# Patient Record
Sex: FEMALE | Race: WHITE | Employment: UNEMPLOYED | URBAN - METROPOLITAN AREA
[De-identification: names, ages, dates, MRNs, and addresses within clinical notes are randomized per-mention and may not be internally consistent; named-entity substitution may affect disease eponyms.]

---

## 2017-01-04 ENCOUNTER — TELEPHONE (OUTPATIENT)
Dept: FAMILY MEDICINE CLINIC | Age: 52
End: 2017-01-04

## 2017-01-04 NOTE — TELEPHONE ENCOUNTER
Medication: Symbicort 160-4.5 mcg/acc, dose: 2 puffs, how often: BID , current number of medication days provided: 90, refill per application. Lot #: [id number], EXP 01/2018  . This medication was received and verified for the following 1. Correct Patient, 2. Correct Diagnosis, 3. Correct Drug, 4. Correct route, and no current allergy to medication. Please contact patient to come  their medications.      Maeve Oliver MSN, RN, FNP-C     [facility]

## 2017-02-27 ENCOUNTER — TELEPHONE (OUTPATIENT)
Dept: NEUROLOGY | Age: 52
End: 2017-02-27

## 2017-02-27 DIAGNOSIS — R56.9 SEIZURE (HCC): ICD-10-CM

## 2017-02-27 DIAGNOSIS — R56.9 SEIZURE (HCC): Primary | Chronic | ICD-10-CM

## 2017-02-27 RX ORDER — DIVALPROEX SODIUM 500 MG/1
TABLET, EXTENDED RELEASE ORAL
Qty: 60 TAB | Refills: 2 | Status: CANCELLED | OUTPATIENT
Start: 2017-02-27

## 2017-02-27 RX ORDER — FLUOXETINE HYDROCHLORIDE 40 MG/1
40 CAPSULE ORAL DAILY
Qty: 30 CAP | Refills: 3 | Status: SHIPPED | COMMUNITY
Start: 2017-02-27 | End: 2017-03-20

## 2017-02-27 NOTE — TELEPHONE ENCOUNTER
Jethro Duque from [facility] call with a question. States she does not have Depakote DR available within their prescription program. She does have XR in 500mg. Would like to know if this would be okay and would you want to do 2 tabs 2 times a day? Requesting answer by the end of the day.   Office # [phone]  Cell # [phone]

## 2017-02-28 RX ORDER — DIVALPROEX SODIUM 500 MG/1
1500 TABLET, EXTENDED RELEASE ORAL DAILY
Qty: 60 TAB | Refills: 2 | COMMUNITY
Start: 2017-02-28 | End: 2017-04-04 | Stop reason: SDUPTHER

## 2017-02-28 NOTE — TELEPHONE ENCOUNTER
Per Dr. Tesfaye Wynn nurse Cherrie Carreon its ok to change the patient [facility]  with once a day dosing. Will order via [facility]. The patient needs to come in to sign paperwork to order via program. Will forward to [facility] to follow up.     Rafaela Jacob, MSN, RN, FNP-C     [facility]

## 2017-02-28 NOTE — TELEPHONE ENCOUNTER
Fifi Quinn MD                    That would be OK            Called NP Maryruth Bernheim and informed her of Dr. Ritesh Dacosta"

## 2017-03-14 ENCOUNTER — HOSPITAL ENCOUNTER (EMERGENCY)
Age: 52
Discharge: LEFT BEFORE DISCHARGE | End: 2017-03-15
Attending: EMERGENCY MEDICINE
Payer: COMMERCIAL

## 2017-03-14 DIAGNOSIS — F12.10 MARIJUANA ABUSE: ICD-10-CM

## 2017-03-14 DIAGNOSIS — R45.851 SUICIDAL IDEATION: Primary | ICD-10-CM

## 2017-03-14 DIAGNOSIS — F11.10 HEROIN ABUSE (HCC): ICD-10-CM

## 2017-03-14 DIAGNOSIS — F14.10 COCAINE ABUSE (HCC): ICD-10-CM

## 2017-03-14 LAB
AMPHET UR QL SCN: NEGATIVE
ANION GAP BLD CALC-SCNC: 6 MMOL/L (ref 3–18)
BARBITURATES UR QL SCN: NEGATIVE
BASOPHILS # BLD AUTO: 0 K/UL (ref 0–0.1)
BASOPHILS # BLD: 0 % (ref 0–2)
BENZODIAZ UR QL: NEGATIVE
BUN SERPL-MCNC: 29 MG/DL (ref 7–18)
BUN/CREAT SERPL: 40 (ref 12–20)
CALCIUM SERPL-MCNC: 8.9 MG/DL (ref 8.5–10.1)
CANNABINOIDS UR QL SCN: POSITIVE
CHLORIDE SERPL-SCNC: 105 MMOL/L (ref 100–108)
CO2 SERPL-SCNC: 30 MMOL/L (ref 21–32)
COCAINE UR QL SCN: POSITIVE
CREAT SERPL-MCNC: 0.73 MG/DL (ref 0.6–1.3)
DIFFERENTIAL METHOD BLD: ABNORMAL
EOSINOPHIL # BLD: 0.2 K/UL (ref 0–0.4)
EOSINOPHIL NFR BLD: 1 % (ref 0–5)
ERYTHROCYTE [DISTWIDTH] IN BLOOD BY AUTOMATED COUNT: 15.3 % (ref 11.6–14.5)
ETHANOL SERPL-MCNC: <3 MG/DL (ref 0–3)
GLUCOSE SERPL-MCNC: 84 MG/DL (ref 74–99)
HCT VFR BLD AUTO: 39.6 % (ref 35–45)
HDSCOM,HDSCOM: ABNORMAL
HGB BLD-MCNC: 13.8 G/DL (ref 12–16)
LYMPHOCYTES # BLD AUTO: 30 % (ref 21–52)
LYMPHOCYTES # BLD: 3.8 K/UL (ref 0.9–3.6)
MCH RBC QN AUTO: 31.4 PG (ref 24–34)
MCHC RBC AUTO-ENTMCNC: 34.8 G/DL (ref 31–37)
MCV RBC AUTO: 90 FL (ref 74–97)
METHADONE UR QL: NEGATIVE
MONOCYTES # BLD: 0.6 K/UL (ref 0.05–1.2)
MONOCYTES NFR BLD AUTO: 5 % (ref 3–10)
NEUTS SEG # BLD: 7.9 K/UL (ref 1.8–8)
NEUTS SEG NFR BLD AUTO: 64 % (ref 40–73)
OPIATES UR QL: POSITIVE
PCP UR QL: NEGATIVE
PLATELET # BLD AUTO: 207 K/UL (ref 135–420)
PMV BLD AUTO: 11.8 FL (ref 9.2–11.8)
POTASSIUM SERPL-SCNC: 3.6 MMOL/L (ref 3.5–5.5)
RBC # BLD AUTO: 4.4 M/UL (ref 4.2–5.3)
SODIUM SERPL-SCNC: 141 MMOL/L (ref 136–145)
WBC # BLD AUTO: 12.6 K/UL (ref 4.6–13.2)

## 2017-03-14 PROCEDURE — 85025 COMPLETE CBC W/AUTO DIFF WBC: CPT | Performed by: EMERGENCY MEDICINE

## 2017-03-14 PROCEDURE — 80048 BASIC METABOLIC PNL TOTAL CA: CPT | Performed by: EMERGENCY MEDICINE

## 2017-03-14 PROCEDURE — 80307 DRUG TEST PRSMV CHEM ANLYZR: CPT | Performed by: EMERGENCY MEDICINE

## 2017-03-14 PROCEDURE — 99285 EMERGENCY DEPT VISIT HI MDM: CPT

## 2017-03-14 NOTE — ED PROVIDER NOTES
[facility] EMERGENCY DEPT      46 y.o. female with noted past medical history who presents to the emergency department for heroin detoxification and suicidal ideation. No other complaints. No current facility-administered medications for this encounter. Current Outpatient Prescriptions   Medication Sig    divalproex ER (DEPAKOTE ER) 500 mg ER tablet Take 3 Tabs by mouth daily.  FLUoxetine (PROZAC) 40 mg capsule Take 1 Cap by mouth daily. Indications: ANXIETY WITH DEPRESSION    omega-3 acid ethyl esters (LOVAZA) 1 gram capsule Take 2 Caps by mouth daily (with breakfast). Indications: HYPERTRIGLYCERIDEMIA    budesonide-formoterol (SYMBICORT) 160-4.5 mcg/actuation HFA inhaler Take 2 Puffs by inhalation two (2) times a day.  lacosamide (VIMPAT) 200 mg tab tablet Take 1 Tab by mouth two (2) times a day. Indications: PARTIAL EPILEPSY TREATMENT ADJUNCT    DULoxetine (CYMBALTA) 60 mg capsule Take 1 Cap by mouth daily.  albuterol (VENTOLIN HFA) 90 mcg/actuation inhaler Take 2 Puffs by inhalation every four (4) hours as needed for Wheezing or Shortness of Breath (Cough). Indications: CHRONIC OBSTRUCTIVE PULMONARY DISEASE       Past Medical History:   Diagnosis Date    Chronic obstructive pulmonary disease ([address] 2016    severe. PFT confirmed. 2016    COPD     Cord compression [facility])     Dr Brit Sánchez Current smoker 2016    Depression     Hepatitis C antibody test positive 2015    Negative viral load, Immune    History of cocaine abuse     History of heroin abuse     Hypercholesterolemia     Other diseases of lung, not elsewhere classified     pneumonia    Seizures ([address] 2007    complex partial, unk etiology;  [facility] [place].  Ora Counter, Neurology-    Spinal stenosis     Thyroid disease     many years per pt    Tobacco use        Past Surgical History:   Procedure Laterality Date    HX CERVICAL DISKECTOMY      HX  SECTION      HX GYN            HX TUBAL LIGATION   Family History   Problem Relation Age of Onset    Cancer Father 48     lung    Lung Disease Father     Cancer Maternal Grandmother      lung    Lung Disease Maternal Grandmother        Social History     Social History    Marital status: UNKNOWN     Spouse name: N/A    Number of children: 2    Years of education: 8     Occupational History    Not on file. Social History Main Topics    Smoking status: Current Every Day Smoker     Packs/day: 0.25     Years: 35.00     Types: Cigarettes    Smokeless tobacco: Never Used    Alcohol use No    Drug use: 7.00 per week     Special: Heroin, Marijuana      Comment:  states it has been awhile since she did Heroin.  Sexual activity: Yes     Partners: Male     Birth control/ protection: Condom     Other Topics Concern     Service No    Blood Transfusions No    Caffeine Concern No    Occupational Exposure No    Hobby Hazards No    Sleep Concern No    Stress Concern No    Weight Concern No    Special Diet No    Back Care No    Exercise No    Bike Helmet No    Seat Belt Yes    Self-Exams No     Social History Narrative       Allergies   Allergen Reactions    Ampicillin Anaphylaxis    Penicillin G Anaphylaxis    Adhesive Tape-Silicones Rash    Tegretol [Carbamazepine] Rash       Patient's primary care provider (as noted in EPIC):  Tawanna Body, NP    REVIEW OF SYSTEMS:    Constitutional:  Negative for diaphoresis. HENT:  Negative for congestion. Respiratory:  Negative for cough and shortness of breath. Cardiovascular:  Negative for chest pain and palpitations. Gastrointestinal:  Negative for diarrhea. Genitourinary:  Negative for flank pain. Musculoskeletal:  Negative for back pain. Skin:  Negative for pallor. Neurological:  Negative for focal numbness, weakness or tingling. Negative for slurred speech.     Visit Vitals    /72 (BP 1 Location: Left arm, BP Patient Position: At rest;Sitting)    Pulse 83    Temp 98 °F (36.7 °C)    Resp 18    Ht 5' 1\" (1.549 m)    Wt 49.9 kg (110 lb)    SpO2 99%    BMI 20.78 kg/m2       PHYSICAL EXAM:    CONSTITUTIONAL:  Alert, in no apparent distress;  well developed;  well nourished. HEAD:  Normocephalic, atraumatic. EYES:  EOMI. Non-icteric sclera. Normal conjunctiva. ENTM:  Nose:  no rhinorrhea. Throat:  no erythema or exudate, mucous membranes moist.  NECK:  No JVD. Supple  RESPIRATORY:  Chest clear, equal breath sounds, good air movement. CARDIOVASCULAR:  Regular rate and rhythm. No murmurs, rubs, or gallops. GI:  Normal bowel sounds, abdomen soft and non-tender. No rebound or guarding. BACK:  Non-tender. UPPER EXT:  Normal inspection. LOWER EXT:  No edema, no calf tenderness. Distal pulses intact. NEURO:  Moves all four extremities, and grossly normal motor exam.  SKIN:  No rashes;  Normal for age. PSYCH:  Alert and normal affect. DIFFERENTIAL DIAGNOSES/ MEDICAL DECISION MAKING:   Differential diagnoses/impression: suicidal ideation in patient, need to rule out obvious organic causes versus psychological etiology. Based on patient's presentation and lab work, I do not believe that there is an obvious organic etiology for the patient's suicidal ideation. I believe the patient needs psychiatric evaluation and treatment for the suicidal ideation. ED COURSE:      Abnormal lab results from this emergency department encounter:  Labs Reviewed   CBC WITH AUTOMATED DIFF - Abnormal; Notable for the following:        Result Value    RDW 15.3 (*)     ABS.  LYMPHOCYTES 3.8 (*)     All other components within normal limits   METABOLIC PANEL, BASIC - Abnormal; Notable for the following:     BUN 29 (*)     BUN/Creatinine ratio 40 (*)     All other components within normal limits   ETHYL ALCOHOL   DRUG SCREEN, URINE       Lab values for this patient within approximately the last 12 hours:  Recent Results (from the past 12 hour(s))   ETHYL ALCOHOL    Collection Time: 03/14/17  5:30 PM   Result Value Ref Range    ALCOHOL(ETHYL),SERUM <3 0 - 3 MG/DL   CBC WITH AUTOMATED DIFF    Collection Time: 03/14/17  5:30 PM   Result Value Ref Range    WBC 12.6 4.6 - 13.2 K/uL    RBC 4.40 4.20 - 5.30 M/uL    HGB 13.8 12.0 - 16.0 g/dL    HCT 39.6 35.0 - 45.0 %    MCV 90.0 74.0 - 97.0 FL    MCH 31.4 24.0 - 34.0 PG    MCHC 34.8 31.0 - 37.0 g/dL    RDW 15.3 (H) 11.6 - 14.5 %    PLATELET 924 721 - 461 K/uL    MPV 11.8 9.2 - 11.8 FL    NEUTROPHILS 64 40 - 73 %    LYMPHOCYTES 30 21 - 52 %    MONOCYTES 5 3 - 10 %    EOSINOPHILS 1 0 - 5 %    BASOPHILS 0 0 - 2 %    ABS. NEUTROPHILS 7.9 1.8 - 8.0 K/UL    ABS. LYMPHOCYTES 3.8 (H) 0.9 - 3.6 K/UL    ABS. MONOCYTES 0.6 0.05 - 1.2 K/UL    ABS. EOSINOPHILS 0.2 0.0 - 0.4 K/UL    ABS. BASOPHILS 0.0 0.0 - 0.1 K/UL    DF AUTOMATED     METABOLIC PANEL, BASIC    Collection Time: 03/14/17  5:30 PM   Result Value Ref Range    Sodium 141 136 - 145 mmol/L    Potassium 3.6 3.5 - 5.5 mmol/L    Chloride 105 100 - 108 mmol/L    CO2 30 21 - 32 mmol/L    Anion gap 6 3.0 - 18 mmol/L    Glucose 84 74 - 99 mg/dL    BUN 29 (H) 7.0 - 18 MG/DL    Creatinine 0.73 0.6 - 1.3 MG/DL    BUN/Creatinine ratio 40 (H) 12 - 20      GFR est AA >60 >60 ml/min/1.73m2    GFR est non-AA >60 >60 ml/min/1.73m2    Calcium 8.9 8.5 - 10.1 MG/DL       Radiologist and cardiologist interpretations if available at time of this note:  No orders to display       Medication(s) ordered for patient during this emergency visit encounter:  Medications - No data to display    ED COURSE:  The patient has no active medical issues. I believe that the patient is medically cleared for admission to a psychiatric unit if this is deemed appropriate by the crisis staff after their evaluation of the patient.      IMPRESSION AND MEDICAL DECISION MAKING:  Based upon the patient's presentation with noted HPI and PE, along with the work up done in the emergency department, I believe that the patient is having heroin abuse and seeking detoxification that  MAY require admission and further evaluation on a psychiatric/ behavioral medicine unit. THE PATIENT IS MEDICALLY CLEARED FOR ADMISSION TO A PSYCHIATRIC UNIT.    7:55 PM  I spoke to the [facility] crisis nurse, Ann-Marie Root. Final disposition of the patient will be determined based on the recommendation of the crisis nurse. Condition:  Stable    DIAGNOSIS:  1. Heroin abuse, seeking detoxification. 2.  Suicidal ideation. Signout Note    7:43 PM  I, Jeannette Wallace, presented patient and ED course to oncoming ED provider Dr. Jacqueline Granger. Patient will be signed out to the oncoming ED physician who will follow up on any pending labs and provide the appropriate patient disposition. Crisis evaluation of patient and disposition recommendation pending at time of signout. Priya Enriquez M.D. Provider Attestation:  If a scribe was utilized in generation of this patient record, I personally performed the services described in the documentation, reviewed the documentation, as recorded by the scribe in my presence, and it accurately records the patient's history of presenting illness, review of systems, patient physical examination, and procedures performed by me as the attending physician. Priya Enriquez M.D.   [facility] Board Certified Emergency Physician  3/14/2017.  7:43 PM

## 2017-03-14 NOTE — ED TRIAGE NOTES
presents with family, for drug abuse problem. Tearful in triage, requesting detox from heroine. Last used this morning.    States \"SI when using\"

## 2017-03-15 VITALS
SYSTOLIC BLOOD PRESSURE: 129 MMHG | BODY MASS INDEX: 20.77 KG/M2 | HEIGHT: 61 IN | TEMPERATURE: 98.5 F | HEART RATE: 91 BPM | DIASTOLIC BLOOD PRESSURE: 93 MMHG | RESPIRATION RATE: 16 BRPM | WEIGHT: 110 LBS | OXYGEN SATURATION: 98 %

## 2017-03-15 PROCEDURE — 74011250637 HC RX REV CODE- 250/637: Performed by: EMERGENCY MEDICINE

## 2017-03-15 RX ORDER — CLONIDINE HYDROCHLORIDE 0.1 MG/1
0.1 TABLET ORAL
Status: DISCONTINUED | OUTPATIENT
Start: 2017-03-15 | End: 2017-03-15 | Stop reason: HOSPADM

## 2017-03-15 RX ORDER — LOPERAMIDE HYDROCHLORIDE 2 MG/1
2 CAPSULE ORAL AS NEEDED
Status: DISCONTINUED | OUTPATIENT
Start: 2017-03-15 | End: 2017-03-15 | Stop reason: HOSPADM

## 2017-03-15 RX ORDER — LOPERAMIDE HYDROCHLORIDE 2 MG/1
2 CAPSULE ORAL ONCE
Status: COMPLETED | OUTPATIENT
Start: 2017-03-15 | End: 2017-03-15

## 2017-03-15 RX ORDER — CLONIDINE 0.2 MG/24H
1 PATCH, EXTENDED RELEASE TRANSDERMAL
Status: DISCONTINUED | OUTPATIENT
Start: 2017-03-15 | End: 2017-03-15 | Stop reason: HOSPADM

## 2017-03-15 RX ORDER — HYDROXYZINE PAMOATE 25 MG/1
25 CAPSULE ORAL
Status: DISCONTINUED | OUTPATIENT
Start: 2017-03-15 | End: 2017-03-15 | Stop reason: HOSPADM

## 2017-03-15 RX ORDER — IBUPROFEN 400 MG/1
400 TABLET ORAL
Status: DISCONTINUED | OUTPATIENT
Start: 2017-03-15 | End: 2017-03-15 | Stop reason: HOSPADM

## 2017-03-15 RX ORDER — LORAZEPAM 1 MG/1
1 TABLET ORAL
Status: COMPLETED | OUTPATIENT
Start: 2017-03-15 | End: 2017-03-15

## 2017-03-15 RX ORDER — ACETAMINOPHEN 500 MG
1000 TABLET ORAL
Status: COMPLETED | OUTPATIENT
Start: 2017-03-15 | End: 2017-03-15

## 2017-03-15 RX ADMIN — IBUPROFEN 400 MG: 400 TABLET, FILM COATED ORAL at 14:39

## 2017-03-15 RX ADMIN — ACETAMINOPHEN 1000 MG: 500 TABLET ORAL at 17:02

## 2017-03-15 RX ADMIN — HYDROXYZINE PAMOATE 25 MG: 25 CAPSULE ORAL at 14:39

## 2017-03-15 RX ADMIN — LORAZEPAM 1 MG: 1 TABLET ORAL at 03:45

## 2017-03-15 RX ADMIN — LOPERAMIDE HYDROCHLORIDE 2 MG: 2 CAPSULE ORAL at 14:39

## 2017-03-15 RX ADMIN — CLONIDINE HYDROCHLORIDE 0.1 MG: 0.1 TABLET ORAL at 14:40

## 2017-03-15 RX ADMIN — LOPERAMIDE HYDROCHLORIDE 2 MG: 2 CAPSULE ORAL at 03:45

## 2017-03-15 NOTE — BSMART NOTE
Comprehensive Assessment Form Part 1    Section I - Disposition      The Medical Doctor to Psychiatrist conference was not completed. The Medical Doctor is in agreement with Psychiatrist disposition because of (reason) suicidal ideations. The plan is will contact [facility] to have evaluated for a CSU. Section II - Integrated Summary  Summary:  46year old female brought to the ER by a family member for suicidal ideations. Interviewed in room 15 @ the request of Dr. Jeremiah Cortez. Patient dressed in her personal clothing. Cooperative with interview. Alert and oriented. \" I need to get off the dope. Please help me. \" Patient reports suicidal ideations for the past two days. Has verbalized no plan. Reports she is using \" as much as I can get. \" of heroin IV daily. Reports has used up to 2 bundles in a day. States she has been using heroin since the age of 6years old. Patient appears much older than her age of 46. Begging for help. Reports she is living with friends in [place]. Denied any psychosis. Denied any homicidal ideations. Inpatient: reports in the 90's she was in a rehab in the [facility] area she believes was called 911. Outpatient: she gets her medications for her depression, seizure disorder, and her inhalers from the [facility]. Sees Dat Ortiz NP there. Legal: none reported. Medications: Depakote ER 1500 mg daily and Vimpak 200 mg bid for her seizure disorder, reports her last seizure was about 2 weeks ago, reports compliance with all her medications. Also is taking Cymbalta 60 mg daily and Prozac 40 mg daily for depression. Albuterol inhaler as need and Symbicort 160/4.5 two puffs bid. Allergic: Ampicillin, Penicillin G, Adhesive tape/silicones and Tegretol    The patient is deemed competent to provide informed consent. The Chief Complaint is \" I need to get off the dope\". Reporting suicidal ideations  .   The Precipitant Factors are is currently being treated for depression, chronic heroin abuse/addiction. .      Section V - Substance Abuse  The patient is using substances. The patient is using tobacco by inhalation for greater than 10 years with last use on 3/15/17, cannabis by inhalation for greater than 10 years  and heroin IV for greater than 10 years with last use on 3/14/17. Patients drug screen was also positive for cocaine however she denies using cocaine. . The patient has experienced the following withdrawal symptoms,  diarrhea, chills, body aches, cravings and sleep disturbance.       Brianna Peter RN

## 2017-03-15 NOTE — ED NOTES
Bedside and Verbal shift change report given to Candice Glynn RN (oncoming nurse) by Alon Jacob RN (offgoing nurse). Report included the following information SBAR, ED Summary and MAR.

## 2017-03-15 NOTE — ED NOTES
Pt signed out to me by Dr. Candy Romano pending crisis evaluation. Pt was evaluated by crisis, will ask [facility] to eval for CSU placement    Pt was seen by [facility], will look for a CSU bed. Pt with complaints of diffuse pain, opiate withdrawal pathway initiated. Pt eloped from ED. Nursing staff has contacted PD.

## 2017-03-15 NOTE — ED NOTES
Pt is resting on bed in position of comfort, respirations are equal and unlabored, NAD noted at this time.

## 2017-03-15 NOTE — ED NOTES
Patient not in hallbed. Per security, patient went outside to smoke with a friend. States she would come right back.

## 2017-03-15 NOTE — ED NOTES
Pt is sleeping but responds promptly to verbal stimuli. Respirations are equal and unlabored and NAD noted at this time.

## 2017-03-15 NOTE — ED NOTES
Report received from Abi Pagan, [facility]. Assuming care of patient at this time.

## 2017-03-15 NOTE — BSMART NOTE
Call placed with the answering service of [facility] for on call to request a CSU screening. Colleen Villalpando on call for [facility] informed of request for a CSU screening.

## 2017-03-16 ENCOUNTER — HOSPITAL ENCOUNTER (INPATIENT)
Age: 52
LOS: 4 days | Discharge: HOME | DRG: 885 | End: 2017-03-20
Attending: EMERGENCY MEDICINE | Admitting: PSYCHIATRY & NEUROLOGY
Payer: COMMERCIAL

## 2017-03-16 DIAGNOSIS — F11.10 HEROIN ABUSE (HCC): Primary | ICD-10-CM

## 2017-03-16 LAB
ALBUMIN SERPL BCP-MCNC: 3.6 G/DL (ref 3.4–5)
ALBUMIN/GLOB SERPL: 0.8 {RATIO} (ref 0.8–1.7)
ALP SERPL-CCNC: 67 U/L (ref 45–117)
ALT SERPL-CCNC: 20 U/L (ref 13–56)
AMPHET UR QL SCN: NEGATIVE
ANION GAP BLD CALC-SCNC: 4 MMOL/L (ref 3–18)
APAP SERPL-MCNC: 10 UG/ML (ref 10–30)
APPEARANCE UR: CLEAR
AST SERPL W P-5'-P-CCNC: 14 U/L (ref 15–37)
BACTERIA URNS QL MICRO: NEGATIVE /HPF
BARBITURATES UR QL SCN: NEGATIVE
BASOPHILS # BLD AUTO: 0 K/UL (ref 0–0.1)
BASOPHILS # BLD: 0 % (ref 0–2)
BENZODIAZ UR QL: NEGATIVE
BILIRUB DIRECT SERPL-MCNC: <0.1 MG/DL (ref 0–0.2)
BILIRUB SERPL-MCNC: 0.2 MG/DL (ref 0.2–1)
BILIRUB UR QL: NEGATIVE
BUN SERPL-MCNC: 17 MG/DL (ref 7–18)
BUN/CREAT SERPL: 25 (ref 12–20)
CALCIUM SERPL-MCNC: 9.5 MG/DL (ref 8.5–10.1)
CANNABINOIDS UR QL SCN: POSITIVE
CHLORIDE SERPL-SCNC: 104 MMOL/L (ref 100–108)
CO2 SERPL-SCNC: 33 MMOL/L (ref 21–32)
COCAINE UR QL SCN: POSITIVE
COLOR UR: ABNORMAL
CREAT SERPL-MCNC: 0.69 MG/DL (ref 0.6–1.3)
DIFFERENTIAL METHOD BLD: ABNORMAL
EOSINOPHIL # BLD: 0.1 K/UL (ref 0–0.4)
EOSINOPHIL NFR BLD: 1 % (ref 0–5)
EPITH CASTS URNS QL MICRO: ABNORMAL /LPF (ref 0–5)
ERYTHROCYTE [DISTWIDTH] IN BLOOD BY AUTOMATED COUNT: 15.5 % (ref 11.6–14.5)
ETHANOL SERPL-MCNC: <3 MG/DL (ref 0–3)
GLOBULIN SER CALC-MCNC: 4.3 G/DL (ref 2–4)
GLUCOSE SERPL-MCNC: 131 MG/DL (ref 74–99)
GLUCOSE UR STRIP.AUTO-MCNC: NEGATIVE MG/DL
HCT VFR BLD AUTO: 41.9 % (ref 35–45)
HDSCOM,HDSCOM: ABNORMAL
HGB BLD-MCNC: 14.1 G/DL (ref 12–16)
HGB UR QL STRIP: NEGATIVE
HYALINE CASTS URNS QL MICRO: ABNORMAL /LPF (ref 0–2)
KETONES UR QL STRIP.AUTO: ABNORMAL MG/DL
LEUKOCYTE ESTERASE UR QL STRIP.AUTO: ABNORMAL
LYMPHOCYTES # BLD AUTO: 34 % (ref 21–52)
LYMPHOCYTES # BLD: 3.2 K/UL (ref 0.9–3.6)
MCH RBC QN AUTO: 31.1 PG (ref 24–34)
MCHC RBC AUTO-ENTMCNC: 33.7 G/DL (ref 31–37)
MCV RBC AUTO: 92.5 FL (ref 74–97)
METHADONE UR QL: NEGATIVE
MONOCYTES # BLD: 0.5 K/UL (ref 0.05–1.2)
MONOCYTES NFR BLD AUTO: 5 % (ref 3–10)
MUCOUS THREADS URNS QL MICRO: ABNORMAL /LPF
NEUTS SEG # BLD: 5.7 K/UL (ref 1.8–8)
NEUTS SEG NFR BLD AUTO: 60 % (ref 40–73)
NITRITE UR QL STRIP.AUTO: NEGATIVE
OPIATES UR QL: POSITIVE
PCP UR QL: NEGATIVE
PH UR STRIP: 6.5 [PH] (ref 5–8)
PLATELET # BLD AUTO: 241 K/UL (ref 135–420)
PMV BLD AUTO: 11.9 FL (ref 9.2–11.8)
POTASSIUM SERPL-SCNC: 4.3 MMOL/L (ref 3.5–5.5)
PROT SERPL-MCNC: 7.9 G/DL (ref 6.4–8.2)
PROT UR STRIP-MCNC: NEGATIVE MG/DL
RBC # BLD AUTO: 4.53 M/UL (ref 4.2–5.3)
RBC #/AREA URNS HPF: ABNORMAL /HPF (ref 0–5)
SALICYLATES SERPL-MCNC: 4 MG/DL (ref 2.8–20)
SODIUM SERPL-SCNC: 141 MMOL/L (ref 136–145)
SP GR UR REFRACTOMETRY: 1.03 (ref 1–1.03)
UROBILINOGEN UR QL STRIP.AUTO: 1 EU/DL (ref 0.2–1)
WBC # BLD AUTO: 9.4 K/UL (ref 4.6–13.2)
WBC URNS QL MICRO: ABNORMAL /HPF (ref 0–4)

## 2017-03-16 PROCEDURE — 80307 DRUG TEST PRSMV CHEM ANLYZR: CPT | Performed by: PHYSICIAN ASSISTANT

## 2017-03-16 PROCEDURE — 65220000003 HC RM SEMIPRIVATE PSYCH

## 2017-03-16 PROCEDURE — 99283 EMERGENCY DEPT VISIT LOW MDM: CPT

## 2017-03-16 PROCEDURE — HZ2ZZZZ DETOXIFICATION SERVICES FOR SUBSTANCE ABUSE TREATMENT: ICD-10-PCS | Performed by: PSYCHIATRY & NEUROLOGY

## 2017-03-16 PROCEDURE — 80076 HEPATIC FUNCTION PANEL: CPT | Performed by: PHYSICIAN ASSISTANT

## 2017-03-16 PROCEDURE — 74011250637 HC RX REV CODE- 250/637: Performed by: PSYCHIATRY & NEUROLOGY

## 2017-03-16 PROCEDURE — 85025 COMPLETE CBC W/AUTO DIFF WBC: CPT | Performed by: PHYSICIAN ASSISTANT

## 2017-03-16 PROCEDURE — 80048 BASIC METABOLIC PNL TOTAL CA: CPT | Performed by: PHYSICIAN ASSISTANT

## 2017-03-16 PROCEDURE — 74011250637 HC RX REV CODE- 250/637: Performed by: EMERGENCY MEDICINE

## 2017-03-16 PROCEDURE — 81001 URINALYSIS AUTO W/SCOPE: CPT | Performed by: PHYSICIAN ASSISTANT

## 2017-03-16 RX ORDER — LACOSAMIDE 50 MG/1
200 TABLET ORAL 2 TIMES DAILY
Status: DISCONTINUED | OUTPATIENT
Start: 2017-03-16 | End: 2017-03-20 | Stop reason: HOSPADM

## 2017-03-16 RX ORDER — HYDROXYZINE PAMOATE 25 MG/1
25 CAPSULE ORAL
Status: DISCONTINUED | OUTPATIENT
Start: 2017-03-16 | End: 2017-03-20 | Stop reason: HOSPADM

## 2017-03-16 RX ORDER — LORAZEPAM 1 MG/1
1 TABLET ORAL
Status: COMPLETED | OUTPATIENT
Start: 2017-03-16 | End: 2017-03-16

## 2017-03-16 RX ORDER — DULOXETIN HYDROCHLORIDE 60 MG/1
60 CAPSULE, DELAYED RELEASE ORAL DAILY
Status: DISCONTINUED | OUTPATIENT
Start: 2017-03-17 | End: 2017-03-20 | Stop reason: HOSPADM

## 2017-03-16 RX ORDER — IBUPROFEN 400 MG/1
400 TABLET ORAL
Status: DISCONTINUED | OUTPATIENT
Start: 2017-03-16 | End: 2017-03-16

## 2017-03-16 RX ORDER — LORAZEPAM 1 MG/1
1-2 TABLET ORAL
Status: DISCONTINUED | OUTPATIENT
Start: 2017-03-16 | End: 2017-03-20 | Stop reason: HOSPADM

## 2017-03-16 RX ORDER — CLONIDINE HYDROCHLORIDE 0.1 MG/1
0.1 TABLET ORAL 4 TIMES DAILY
Status: COMPLETED | OUTPATIENT
Start: 2017-03-16 | End: 2017-03-17

## 2017-03-16 RX ORDER — IBUPROFEN 600 MG/1
600 TABLET ORAL
Status: DISCONTINUED | OUTPATIENT
Start: 2017-03-16 | End: 2017-03-20 | Stop reason: HOSPADM

## 2017-03-16 RX ORDER — CLONIDINE HYDROCHLORIDE 0.1 MG/1
0.1 TABLET ORAL 2 TIMES DAILY
Status: DISCONTINUED | OUTPATIENT
Start: 2017-03-18 | End: 2017-03-18

## 2017-03-16 RX ORDER — TRAZODONE HYDROCHLORIDE 50 MG/1
50 TABLET ORAL
Status: DISCONTINUED | OUTPATIENT
Start: 2017-03-16 | End: 2017-03-20 | Stop reason: HOSPADM

## 2017-03-16 RX ORDER — IBUPROFEN 200 MG
1 TABLET ORAL DAILY
Status: DISCONTINUED | OUTPATIENT
Start: 2017-03-16 | End: 2017-03-18

## 2017-03-16 RX ORDER — ALBUTEROL SULFATE 0.83 MG/ML
2.5 SOLUTION RESPIRATORY (INHALATION)
Status: DISCONTINUED | OUTPATIENT
Start: 2017-03-16 | End: 2017-03-20 | Stop reason: HOSPADM

## 2017-03-16 RX ORDER — LORAZEPAM 2 MG/ML
1-2 INJECTION INTRAMUSCULAR
Status: DISCONTINUED | OUTPATIENT
Start: 2017-03-16 | End: 2017-03-20 | Stop reason: HOSPADM

## 2017-03-16 RX ORDER — CLONIDINE HYDROCHLORIDE 0.1 MG/1
0.2 TABLET ORAL
Status: COMPLETED | OUTPATIENT
Start: 2017-03-16 | End: 2017-03-16

## 2017-03-16 RX ORDER — PROMETHAZINE HYDROCHLORIDE 25 MG/ML
25 INJECTION, SOLUTION INTRAMUSCULAR; INTRAVENOUS
Status: DISCONTINUED | OUTPATIENT
Start: 2017-03-16 | End: 2017-03-20 | Stop reason: HOSPADM

## 2017-03-16 RX ORDER — HALOPERIDOL 5 MG/1
5 TABLET ORAL
Status: DISCONTINUED | OUTPATIENT
Start: 2017-03-16 | End: 2017-03-20 | Stop reason: HOSPADM

## 2017-03-16 RX ORDER — ALBUTEROL SULFATE 90 UG/1
2 AEROSOL, METERED RESPIRATORY (INHALATION)
Status: DISCONTINUED | OUTPATIENT
Start: 2017-03-16 | End: 2017-03-16 | Stop reason: CLARIF

## 2017-03-16 RX ORDER — FLUOXETINE HYDROCHLORIDE 20 MG/1
40 CAPSULE ORAL DAILY
Status: DISCONTINUED | OUTPATIENT
Start: 2017-03-17 | End: 2017-03-18

## 2017-03-16 RX ORDER — BUDESONIDE AND FORMOTEROL FUMARATE DIHYDRATE 160; 4.5 UG/1; UG/1
2 AEROSOL RESPIRATORY (INHALATION)
Status: DISCONTINUED | OUTPATIENT
Start: 2017-03-16 | End: 2017-03-20 | Stop reason: HOSPADM

## 2017-03-16 RX ORDER — METHOCARBAMOL 500 MG/1
750 TABLET, FILM COATED ORAL
Status: DISCONTINUED | OUTPATIENT
Start: 2017-03-16 | End: 2017-03-20 | Stop reason: HOSPADM

## 2017-03-16 RX ORDER — DIVALPROEX SODIUM 500 MG/1
1500 TABLET, EXTENDED RELEASE ORAL DAILY
Status: DISCONTINUED | OUTPATIENT
Start: 2017-03-17 | End: 2017-03-20 | Stop reason: HOSPADM

## 2017-03-16 RX ORDER — CLONIDINE HYDROCHLORIDE 0.1 MG/1
0.1 TABLET ORAL 3 TIMES DAILY
Status: COMPLETED | OUTPATIENT
Start: 2017-03-17 | End: 2017-03-18

## 2017-03-16 RX ORDER — HALOPERIDOL 5 MG/ML
5 INJECTION INTRAMUSCULAR
Status: DISCONTINUED | OUTPATIENT
Start: 2017-03-16 | End: 2017-03-20 | Stop reason: HOSPADM

## 2017-03-16 RX ADMIN — LACOSAMIDE 200 MG: 50 TABLET, FILM COATED ORAL at 20:19

## 2017-03-16 RX ADMIN — BUDESONIDE AND FORMOTEROL FUMARATE DIHYDRATE 2 PUFF: 160; 4.5 AEROSOL RESPIRATORY (INHALATION) at 20:22

## 2017-03-16 RX ADMIN — LORAZEPAM 1 MG: 1 TABLET ORAL at 21:54

## 2017-03-16 RX ADMIN — CLONIDINE HYDROCHLORIDE 0.1 MG: 0.1 TABLET ORAL at 17:04

## 2017-03-16 RX ADMIN — Medication 2 CAPSULE: at 17:04

## 2017-03-16 RX ADMIN — CLONIDINE HYDROCHLORIDE 0.2 MG: 0.1 TABLET ORAL at 15:02

## 2017-03-16 RX ADMIN — CLONIDINE HYDROCHLORIDE 0.1 MG: 0.1 TABLET ORAL at 20:19

## 2017-03-16 RX ADMIN — LORAZEPAM 1 MG: 1 TABLET ORAL at 15:01

## 2017-03-16 NOTE — ED TRIAGE NOTES
\"for the past three days I've been having pain all over, I'm a heroin addict and I need help getting off of it. \" patient appears extremely agitated, and teary eyed\"

## 2017-03-16 NOTE — ED PROVIDER NOTES
HPI Comments: 1:33 PM Carol Fitch is a 46 y.o. female with a history of COPD, Seizures and Hypercholesterolemia who presents to the emergency department for drug detox. Pt states that she is an IV heroine user and would like help to stop using. She states that her last use was last night. She also admits to marijuana use but denies any other illicit drug or alcohol use. Pt denies SI or HI at this time. She notes that she is on Depakote and Vimpat and takes all medication appropriately, pt last seizure was about 2 weeks ago. No other acute complaints or concerns were noted at this time. PCP: Valentín Canela NP      The history is provided by the patient. Past Medical History:   Diagnosis Date    Chronic obstructive pulmonary disease ([facility] 2016    severe. PFT confirmed. 2016    COPD     Cord compression [facility])     Dr Sims Members Current smoker 2016    Depression     Hepatitis C antibody test positive 2015    Negative viral load, Immune    History of cocaine abuse     History of heroin abuse     Hypercholesterolemia     Other diseases of lung, not elsewhere classified     pneumonia    Seizures ([facility]     complex partial, unk etiology; Dr. Mirtha Allen. Josiah Klein, Neurology-    Spinal stenosis     Thyroid disease     many years per pt    Tobacco use        Past Surgical History:   Procedure Laterality Date    HX CERVICAL DISKECTOMY      HX  SECTION      HX GYN            HX TUBAL LIGATION           Family History:   Problem Relation Age of Onset    Cancer Father 48     lung    Lung Disease Father     Cancer Maternal Grandmother      lung    Lung Disease Maternal Grandmother        Social History     Social History    Marital status: SINGLE     Spouse name: N/A    Number of children: 2    Years of education: 10     Occupational History    Not on file.      Social History Main Topics    Smoking status: Current Every Day Smoker     Packs/day: 0.25     Years: 35.00 Types: Cigarettes    Smokeless tobacco: Never Used    Alcohol use No    Drug use: 7.00 per week     Special: Heroin, Marijuana      Comment:  states it has been awhile since she did Heroin.  Sexual activity: Yes     Partners: Male     Birth control/ protection: Condom     Other Topics Concern     Service No    Blood Transfusions No    Caffeine Concern No    Occupational Exposure No    Hobby Hazards No    Sleep Concern No    Stress Concern No    Weight Concern No    Special Diet No    Back Care No    Exercise No    Bike Helmet No    Seat Belt Yes    Self-Exams No     Social History Narrative         ALLERGIES: Ampicillin; Penicillin g; Adhesive tape-silicones; and Tegretol [carbamazepine]    Review of Systems   Constitutional: Negative for chills and fever. HENT: Negative for congestion and sneezing. Eyes: Negative for visual disturbance. Respiratory: Negative for cough and shortness of breath. Cardiovascular: Negative for chest pain. Gastrointestinal: Negative for abdominal pain, nausea and vomiting. Genitourinary: Negative for difficulty urinating and dysuria. Musculoskeletal: Negative for back pain. Skin: Negative for rash. Neurological: Negative for weakness and headaches. All other systems reviewed and are negative. Vitals:    03/16/17 1229   BP: 124/83   Pulse: 97   Resp: 16   Temp: 98.4 °F (36.9 °C)   SpO2: 98%   Weight: 45.7 kg (100 lb 11.2 oz)            Physical Exam   Constitutional: She is oriented to person, place, and time. She appears well-developed and well-nourished. Appears anxious   HENT:   Head: Normocephalic and atraumatic. Neck: Neck supple. No JVD present. Cardiovascular: Normal rate and regular rhythm. Pulmonary/Chest: Effort normal and breath sounds normal. No respiratory distress. Abdominal: Soft. She exhibits no distension. There is no tenderness. There is no rebound and no guarding. Musculoskeletal: She exhibits no edema. Neurological: She is alert and oriented to person, place, and time. Skin: Skin is warm and dry. No erythema. Psychiatric: Judgment normal.        MDM  Number of Diagnoses or Management Options  Heroin abuse:   Diagnosis management comments: 47 y/o female presents for detox from heroin. Pt was seen in ED yesterday, left prior to having bed available, states she was going to jump in front of car. Pt seen by [facility] today, although she presently denies SI they are concerned due to statements made yesterday and will petition TDO for admission. Check basic labs. Amount and/or Complexity of Data Reviewed  Clinical lab tests: ordered and reviewed      ED Course       Procedures  Vitals:  Patient Vitals for the past 12 hrs:   Temp Pulse Resp BP SpO2   03/16/17 1229 98.4 °F (36.9 °C) 97 16 124/83 98 %   98 %. Percentage is within normal limits.        Medications ordered:   Medications   hydrOXYzine pamoate (VISTARIL) capsule 25 mg (not administered)   amino acids/multivit with iron (BREN-RECOVER) capsule 2 Cap (not administered)   divalproex ER (DEPAKOTE ER) 24 hour tablet 1,500 mg (not administered)   lacosamide (VIMPAT) tablet 200 mg (not administered)   DULoxetine (CYMBALTA) capsule 60 mg (not administered)   FLUoxetine (PROzac) capsule 40 mg (not administered)   albuterol (PROVENTIL HFA, VENTOLIN HFA, PROAIR HFA) inhaler 2 Puff (not administered)   budesonide-formoterol (SYMBICORT) 160-4.5 mcg/actuation HFA inhaler 2 Puff (not administered)   LORazepam (ATIVAN) tablet 1-2 mg (not administered)   haloperidol (HALDOL) tablet 5 mg (not administered)   haloperidol lactate (HALDOL) injection 5 mg (not administered)   LORazepam (ATIVAN) injection 1-2 mg (not administered)   traZODone (DESYREL) tablet 50 mg (not administered)   methocarbamol (ROBAXIN) tablet 750 mg (not administered)   ibuprofen (MOTRIN) tablet 600 mg (not administered)   promethazine (PHENERGAN) injection 25 mg (not administered) cloNIDine HCl (CATAPRES) tablet 0.1 mg (not administered)   cloNIDine HCl (CATAPRES) tablet 0.1 mg (not administered)   cloNIDine HCl (CATAPRES) tablet 0.1 mg (not administered)   cloNIDine HCl (CATAPRES) tablet 0.2 mg (0.2 mg Oral Given 3/16/17 1502)   LORazepam (ATIVAN) tablet 1 mg (1 mg Oral Given 3/16/17 1501)         Lab findings:  Recent Results (from the past 12 hour(s))   CBC WITH AUTOMATED DIFF    Collection Time: 03/16/17  2:09 PM   Result Value Ref Range    WBC 9.4 4.6 - 13.2 K/uL    RBC 4.53 4.20 - 5.30 M/uL    HGB 14.1 12.0 - 16.0 g/dL    HCT 41.9 35.0 - 45.0 %    MCV 92.5 74.0 - 97.0 FL    MCH 31.1 24.0 - 34.0 PG    MCHC 33.7 31.0 - 37.0 g/dL    RDW 15.5 (H) 11.6 - 14.5 %    PLATELET 161 728 - 555 K/uL    MPV 11.9 (H) 9.2 - 11.8 FL    NEUTROPHILS 60 40 - 73 %    LYMPHOCYTES 34 21 - 52 %    MONOCYTES 5 3 - 10 %    EOSINOPHILS 1 0 - 5 %    BASOPHILS 0 0 - 2 %    ABS. NEUTROPHILS 5.7 1.8 - 8.0 K/UL    ABS. LYMPHOCYTES 3.2 0.9 - 3.6 K/UL    ABS. MONOCYTES 0.5 0.05 - 1.2 K/UL    ABS. EOSINOPHILS 0.1 0.0 - 0.4 K/UL    ABS. BASOPHILS 0.0 0.0 - 0.1 K/UL    DF AUTOMATED     HEPATIC FUNCTION PANEL    Collection Time: 03/16/17  2:09 PM   Result Value Ref Range    Protein, total 7.9 6.4 - 8.2 g/dL    Albumin 3.6 3.4 - 5.0 g/dL    Globulin 4.3 (H) 2.0 - 4.0 g/dL    A-G Ratio 0.8 0.8 - 1.7      Bilirubin, total 0.2 0.2 - 1.0 MG/DL    Bilirubin, direct <0.1 0.0 - 0.2 MG/DL    Alk.  phosphatase 67 45 - 117 U/L    AST (SGOT) 14 (L) 15 - 37 U/L    ALT (SGPT) 20 13 - 56 U/L   METABOLIC PANEL, BASIC    Collection Time: 03/16/17  2:09 PM   Result Value Ref Range    Sodium 141 136 - 145 mmol/L    Potassium 4.3 3.5 - 5.5 mmol/L    Chloride 104 100 - 108 mmol/L    CO2 33 (H) 21 - 32 mmol/L    Anion gap 4 3.0 - 18 mmol/L    Glucose 131 (H) 74 - 99 mg/dL    BUN 17 7.0 - 18 MG/DL    Creatinine 0.69 0.6 - 1.3 MG/DL    BUN/Creatinine ratio 25 (H) 12 - 20      GFR est AA >60 >60 ml/min/1.73m2    GFR est non-AA >60 >60 ml/min/1.73m2    Calcium 9.5 8.5 - 45.7 MG/DL   SALICYLATE    Collection Time: 03/16/17  2:09 PM   Result Value Ref Range    SALICYLATE 4.0 2.8 - 64.5 MG/DL   ACETAMINOPHEN    Collection Time: 03/16/17  2:09 PM   Result Value Ref Range    ACETAMINOPHEN 10 10 - 30 ug/mL   ETHYL ALCOHOL    Collection Time: 03/16/17  2:09 PM   Result Value Ref Range    ALCOHOL(ETHYL),SERUM <3 0 - 3 MG/DL   URINALYSIS W/ RFLX MICROSCOPIC    Collection Time: 03/16/17  2:50 PM   Result Value Ref Range    Color DARK YELLOW      Appearance CLEAR      Specific gravity 1.029 1.005 - 1.030      pH (UA) 6.5 5.0 - 8.0      Protein NEGATIVE  NEG mg/dL    Glucose NEGATIVE  NEG mg/dL    Ketone TRACE (A) NEG mg/dL    Bilirubin NEGATIVE  NEG      Blood NEGATIVE  NEG      Urobilinogen 1.0 0.2 - 1.0 EU/dL    Nitrites NEGATIVE  NEG      Leukocyte Esterase TRACE (A) NEG     DRUG SCREEN, URINE    Collection Time: 03/16/17  2:50 PM   Result Value Ref Range    BENZODIAZEPINE NEGATIVE  NEG      BARBITURATES NEGATIVE  NEG      THC (TH-CANNABINOL) POSITIVE (A) NEG      OPIATES POSITIVE (A) NEG      PCP(PHENCYCLIDINE) NEGATIVE  NEG      COCAINE POSITIVE (A) NEG      AMPHETAMINE NEGATIVE  NEG      METHADONE NEGATIVE  NEG      HDSCOM (NOTE)    URINE MICROSCOPIC ONLY    Collection Time: 03/16/17  2:50 PM   Result Value Ref Range    WBC 0 to 2 0 - 4 /hpf    RBC 1 to 3 0 - 5 /hpf    Epithelial cells 1+ 0 - 5 /lpf    Bacteria NEGATIVE  NEG /hpf    Mucus 1+ (A) NEG /lpf    Hyaline cast 1 to 3 0 - 2 /lpf         Progress notes, Consult notes or additional Procedure notes:  Pt medically cleared, stable for admission. Disposition:  Diagnosis:   1.  Heroin abuse        Disposition: admitted      Scribe Attestation:     I, [id number] for and in the presence of Augie Licona DO March 16, 2017 at 4:09 PM     Physician Attestation:   I personally performed the services described in this documentation, reviewed and edited the documentation which was dictated to the scribe in my presence, and it accurately records my words and actions.  Vikas Son DO  March 16, 2017 at 4:09 PM    Signed by: Claus Murray March 16, 2017, 4:09 PM

## 2017-03-16 NOTE — BH NOTES
Patient arrived on unit alert and oriented cooperative with admission procedure. Patient stated she has been using heroin IV since 6years old. States she had a seizure 2 weeks ago. States she is not suicidal at this time but desperately would like to stop using heroin. Denies any thoughts to harm self or others. Ate 80% of dinner. Stated I just want to go to bed.

## 2017-03-16 NOTE — ED NOTES
Per patient, here for SI and heroin addiction. Was here a few days ago and eloped from ED. States she has been using heroin intermittently since she was 5 yo. Was unhappy when she was here bc she states the clonidine was not strong enough for her withdrawals. She spoke with Nery Smith from Crisis unit today and he convinced her to come to the ED. I performed a brief evaluation, including history and physical, of the patient here in triage and I have determined that pt will need further treatment and evaluation from the main side ER physician. I have placed initial orders to help in expediting patients care.      March 16, 2017 at 12:36 PM - Renae Severino PA-C        Visit Vitals    /83 (BP 1 Location: Left arm, BP Patient Position: At rest)    Pulse 97    Temp 98.4 °F (36.9 °C)    Resp 16    Wt 45.7 kg (100 lb 11.2 oz)    SpO2 98%    BMI 19.03 kg/m2

## 2017-03-16 NOTE — IP AVS SNAPSHOT
[address] [facility] Patient: Heri Hanna MRN: [id number] :1965 You are allergic to the following Allergen Reactions Ampicillin Anaphylaxis Penicillin G Anaphylaxis Adhesive Tape-Silicones Rash Tegretol (Carbamazepine) Rash Recent Documentation Weight BMI OB Status Smoking Status 45.7 kg 19.03 kg/m2 Postmenopausal Current Every Day Smoker Unresulted Labs Order Current Status CULTURE, BLOOD Preliminary result CULTURE, BLOOD Preliminary result Emergency Contacts Name Discharge Info Relation Home Work Mobile Abelardo Castillo CAREGIVER [3] Boyfriend [17] [phone] About your hospitalization You were admitted on:  2017 You last received care in the:  [facility] ADULT CHEM DEP You were discharged on:  2017 Unit phone number:  [phone] Why you were hospitalized Your primary diagnosis was:  Not on File Your diagnoses also included:  Depression Providers Seen During Your Hospitalizations Provider Role Specialty Primary office phone Julian Sainz DO Attending Provider Emergency Medicine [phone] Martita Pringle MD Attending Provider Psychiatry [phone] Your Primary Care Physician (PCP) Primary Care Physician Office Phone [facility]ax [facility] [place] [address] [phone] Follow-up Information Follow up With Details Comments Contact Info Intake appointment for substance abuse treatment for the patient at [facility] on 3/24/17 at 2:30 pm.  
  
Your Appointments 2017  2:30 PM EDT Follow Up with Devon Harper NP [facility] ([facility]) [address] [place] [id number] [phone] Current Discharge Medication List  
  
START taking these medications Dose & Instructions Dispensing Information Comments Morning Noon Evening Bedtime  
 gabapentin 300 mg capsule Commonly known as:  NEURONTIN Your last dose was: Your next dose is:    
   
   
 Dose:  300 mg Take 1 Cap by mouth three (3) times daily for 30 days. Indications: NEUROPATHIC PAIN, Mood instability Quantity:  90 Cap Refills:  0  
     
   
   
   
  
 zolpidem 10 mg tablet Commonly known as:  AMBIEN Your last dose was: Your next dose is:    
   
   
 Dose:  10 mg Take 1 Tab by mouth nightly for 30 days. Max Daily Amount: 10 mg. Indications: SLEEP-ONSET INSOMNIA Quantity:  30 Tab Refills:  0 CONTINUE these medications which have CHANGED Dose & Instructions Dispensing Information Comments Morning Noon Evening Bedtime * albuterol 90 mcg/actuation inhaler Commonly known as:  VENTOLIN HFA What changed:  Another medication with the same name was added. Make sure you understand how and when to take each. Your last dose was: Your next dose is:    
   
   
 Dose:  2 Puff Take 2 Puffs by inhalation every four (4) hours as needed for Wheezing or Shortness of Breath (Cough). Indications: CHRONIC OBSTRUCTIVE PULMONARY DISEASE Quantity:  3 Inhaler Refills:  3  
     
   
   
   
  
 * albuterol 2.5 mg /3 mL (0.083 %) nebulizer solution Commonly known as:  PROVENTIL VENTOLIN What changed: You were already taking a medication with the same name, and this prescription was added. Make sure you understand how and when to take each. Your last dose was: Your next dose is:    
   
   
 Dose:  2.5 mg  
3 mL by Nebulization route every four (4) hours as needed for Wheezing or Shortness of Breath for up to 30 days. Indications: Acute Asthma Attack Quantity:  24 Each Refills:  0 * budesonide-formoterol 160-4.5 mcg/actuation HFA inhaler Commonly known as:  SYMBICORT What changed:  Another medication with the same name was added. Make sure you understand how and when to take each. Your last dose was: Your next dose is:    
   
   
 Dose:  2 Puff Take 2 Puffs by inhalation two (2) times a day. Quantity:  3 Inhaler Refills:  3  
     
   
   
   
  
 * budesonide-formoterol 160-4.5 mcg/actuation HFA inhaler Commonly known as:  SYMBICORT What changed: You were already taking a medication with the same name, and this prescription was added. Make sure you understand how and when to take each. Your last dose was: Your next dose is:    
   
   
 Dose:  2 Puff Take 2 Puffs by inhalation two (2) times a day for 30 days. Indications: MAINTENANCE THERAPY FOR ASTHMA Quantity:  1 Inhaler Refills:  0  
     
   
   
   
  
 * divalproex  mg ER tablet Commonly known as:  DEPAKOTE ER What changed:  Another medication with the same name was added. Make sure you understand how and when to take each. Your last dose was: Your next dose is:    
   
   
 Dose:  1500 mg Take 3 Tabs by mouth daily. Quantity:  60 Tab Refills:  2  
     
   
   
   
  
 * divalproex  mg ER tablet Commonly known as:  DEPAKOTE ER What changed: You were already taking a medication with the same name, and this prescription was added. Make sure you understand how and when to take each. Your last dose was: Your next dose is:    
   
   
 Dose:  1500 mg Take 3 Tabs by mouth daily for 30 days. Indications: Epilepsy Quantity:  90 Tab Refills:  0  
     
   
   
   
  
 * DULoxetine 60 mg capsule Commonly known as:  CYMBALTA What changed:  Another medication with the same name was added. Make sure you understand how and when to take each. Your last dose was:     
   
Your next dose is:    
   
   
 Dose:  60 mg  
 Take 1 Cap by mouth daily. Quantity:  90 Cap Refills:  3  
     
   
   
   
  
 * DULoxetine 60 mg capsule Commonly known as:  CYMBALTA What changed: You were already taking a medication with the same name, and this prescription was added. Make sure you understand how and when to take each. Your last dose was: Your next dose is:    
   
   
 Dose:  60 mg Take 1 Cap by mouth daily for 30 days. Indications: GENERALIZED ANXIETY DISORDER, major depressive disorder, NEUROPATHIC PAIN Quantity:  30 Cap Refills:  0  
     
   
   
   
  
 * lacosamide 200 mg Tab tablet Commonly known as:  VIMPAT What changed:  Another medication with the same name was added. Make sure you understand how and when to take each. Your last dose was: Your next dose is:    
   
   
 Dose:  200 mg Take 1 Tab by mouth two (2) times a day. Indications: PARTIAL EPILEPSY TREATMENT ADJUNCT Quantity:  180 Tab Refills:  3  
     
   
   
   
  
 * lacosamide 200 mg Tab tablet Commonly known as:  VIMPAT What changed: You were already taking a medication with the same name, and this prescription was added. Make sure you understand how and when to take each. Your last dose was: Your next dose is:    
   
   
 Dose:  200 mg Take 1 Tab by mouth two (2) times a day for 30 days. Max Daily Amount: 400 mg. Indications: PARTIAL EPILEPSY TREATMENT ADJUNCT Quantity:  60 Tab Refills:  0  
     
   
   
   
  
 * Notice: This list has 10 medication(s) that are the same as other medications prescribed for you. Read the directions carefully, and ask your doctor or other care provider to review them with you. CONTINUE these medications which have NOT CHANGED Dose & Instructions Dispensing Information Comments Morning Noon Evening Bedtime  
 omega-3 acid ethyl esters 1 gram capsule Commonly known as:  Ernesto Serene Your last dose was: Your next dose is: Dose:  2 g Take 2 Caps by mouth daily (with breakfast). Indications: HYPERTRIGLYCERIDEMIA Quantity:  180 Cap Refills:  3 STOP taking these medications FLUoxetine 40 mg capsule Commonly known as:  PROzac Where to Get Your Medications [facility] [address] [place] [address] [place], [address]  [id number] Hours:  24-hours Phone:  [phone]  
  divalproex  mg ER tablet Information on where to get these meds will be given to you by the nurse or doctor. ! Ask your nurse or doctor about these medications  
  albuterol 2.5 mg /3 mL (0.083 %) nebulizer solution  
 budesonide-formoterol 160-4.5 mcg/actuation HFA inhaler DULoxetine 60 mg capsule  
 gabapentin 300 mg capsule  
 lacosamide 200 mg Tab tablet  
 zolpidem 10 mg tablet Discharge Instructions BEHAVIORAL HEALTH NURSING DISCHARGE NOTE The following personal items collected during your admission are returned to you:  
Dental Appliance: Dental Appliances: None Vision: Visual Aid: None, Glasses Hearing Aid:   
Jewelry: Jewelry: None Clothing: Clothing: Footwear, Belt, Jacket/Coat, Pants, [place], Socks, Undergarments, With patient Other Valuables: Other Valuables: None Valuables sent to safe:   
 
 
PATIENT INSTRUCTIONS: 
 
 
 
The discharge information has been reviewed with the patient. The patient verbalized understanding. Patient armband removed and shredded Discharge Orders None [facility] Announcement We are excited to announce that we are making your provider's discharge notes available to you in [email or web address]. You will see these notes when they are completed and signed by the physician that discharged you from your recent hospital stay.   If you have any questions or concerns about any information you see in [employer], please call the Health Information Department where you were seen or reach out to your Primary Care Provider for more information about your plan of care. Introducing [facility]! Theresa Arroyo introduces [employer] patient portal. Now you can access parts of your medical record, email your doctor's office, and request medication refills online. 1. In your internet browser, go to [email or web address] 2. Click on the First Time User? Click Here link in the Sign In box. You will see the New Member Sign Up page. 3. Enter your [employer] Access Code exactly as it appears below. You will not need to use this code after youve completed the sign-up process. If you do not sign up before the expiration date, you must request a new code. · [employer] Access Code: [email or web address] Expires: 4/9/2017  9:33 AM 
 
4. Enter the last four digits of your Social Security Number (xxxx) and Date of Birth (mm/dd/yyyy) as indicated and click Submit. You will be taken to the next sign-up page. 5. Create a [employer] ID. This will be your [employer] login ID and cannot be changed, so think of one that is secure and easy to remember. 6. Create a [employer] password. You can change your password at any time. 7. Enter your Password Reset Question and Answer. This can be used at a later time if you forget your password. 8. Enter your e-mail address. You will receive e-mail notification when new information is available in [address] 9. Click Sign Up. You can now view and download portions of your medical record. 10. Click the Download Summary menu link to download a portable copy of your medical information. If you have questions, please visit the Frequently Asked Questions section of the [employer] website. Remember, [employer] is NOT to be used for urgent needs. For medical emergencies, dial 911. Now available from your iPhone and Android! General Information Please provide this summary of care documentation to your next provider. Patient Signature:  ____________________________________________________________ Date:  ____________________________________________________________  
  
Burlene Bairon Provider Signature:  ____________________________________________________________ Date:  ____________________________________________________________

## 2017-03-16 NOTE — BSMART NOTE
Patient seen in ER yesterday by crisis. Was a referral to a CSU. Was seen by the on call for Emergency Services then Krystyna Madden). Patient eloped from the ER yesterday evening. Colleenlesley Villalpando presented to the crisis office today to inform that he had contacted her by phone and she is reportedly returning to the ER. Per Colleen Villalpando he plans to TDO client. Discussed with Dr. Blanca Tolentino who will accept on a TDO once medical clearance is completed. Continue home meds, routine prn's, and Clonidine taper per his guidelines.

## 2017-03-17 ENCOUNTER — APPOINTMENT (OUTPATIENT)
Dept: GENERAL RADIOLOGY | Age: 52
DRG: 885 | End: 2017-03-17
Attending: PSYCHIATRY & NEUROLOGY
Payer: COMMERCIAL

## 2017-03-17 LAB
APPEARANCE UR: CLEAR
BACTERIA URNS QL MICRO: NEGATIVE /HPF
BASOPHILS # BLD AUTO: 0 K/UL (ref 0–0.06)
BASOPHILS # BLD: 0 % (ref 0–2)
BILIRUB UR QL: NEGATIVE
COLOR UR: YELLOW
DIFFERENTIAL METHOD BLD: ABNORMAL
EOSINOPHIL # BLD: 0.1 K/UL (ref 0–0.4)
EOSINOPHIL NFR BLD: 1 % (ref 0–5)
EPITH CASTS URNS QL MICRO: NORMAL /LPF (ref 0–5)
ERYTHROCYTE [DISTWIDTH] IN BLOOD BY AUTOMATED COUNT: 15.3 % (ref 11.6–14.5)
GLUCOSE UR STRIP.AUTO-MCNC: NEGATIVE MG/DL
HCT VFR BLD AUTO: 42.8 % (ref 35–45)
HGB BLD-MCNC: 14.5 G/DL (ref 12–16)
HGB UR QL STRIP: NEGATIVE
KETONES UR QL STRIP.AUTO: NEGATIVE MG/DL
LEUKOCYTE ESTERASE UR QL STRIP.AUTO: NEGATIVE
LYMPHOCYTES # BLD AUTO: 23 % (ref 21–52)
LYMPHOCYTES # BLD: 2.2 K/UL (ref 0.9–3.6)
MCH RBC QN AUTO: 31 PG (ref 24–34)
MCHC RBC AUTO-ENTMCNC: 33.9 G/DL (ref 31–37)
MCV RBC AUTO: 91.5 FL (ref 74–97)
MONOCYTES # BLD: 0.7 K/UL (ref 0.05–1.2)
MONOCYTES NFR BLD AUTO: 7 % (ref 3–10)
NEUTS SEG # BLD: 6.7 K/UL (ref 1.8–8)
NEUTS SEG NFR BLD AUTO: 69 % (ref 40–73)
NITRITE UR QL STRIP.AUTO: NEGATIVE
PH UR STRIP: 7 [PH] (ref 5–8)
PLATELET # BLD AUTO: 192 K/UL (ref 135–420)
PMV BLD AUTO: 11.1 FL (ref 9.2–11.8)
PROT UR STRIP-MCNC: NEGATIVE MG/DL
RBC # BLD AUTO: 4.68 M/UL (ref 4.2–5.3)
RBC #/AREA URNS HPF: NORMAL /HPF (ref 0–5)
SP GR UR REFRACTOMETRY: 1.01 (ref 1–1.03)
UROBILINOGEN UR QL STRIP.AUTO: 0.2 EU/DL (ref 0.2–1)
WBC # BLD AUTO: 9.8 K/UL (ref 4.6–13.2)
WBC URNS QL MICRO: NORMAL /HPF (ref 0–4)

## 2017-03-17 PROCEDURE — 65220000003 HC RM SEMIPRIVATE PSYCH

## 2017-03-17 PROCEDURE — 74011250637 HC RX REV CODE- 250/637: Performed by: PSYCHIATRY & NEUROLOGY

## 2017-03-17 PROCEDURE — 71020 XR CHEST AP LAT: CPT

## 2017-03-17 PROCEDURE — 87040 BLOOD CULTURE FOR BACTERIA: CPT | Performed by: PSYCHIATRY & NEUROLOGY

## 2017-03-17 PROCEDURE — 81001 URINALYSIS AUTO W/SCOPE: CPT | Performed by: PSYCHIATRY & NEUROLOGY

## 2017-03-17 PROCEDURE — 85025 COMPLETE CBC W/AUTO DIFF WBC: CPT | Performed by: PSYCHIATRY & NEUROLOGY

## 2017-03-17 RX ORDER — ACETAMINOPHEN 325 MG/1
650 TABLET ORAL
Status: DISCONTINUED | OUTPATIENT
Start: 2017-03-17 | End: 2017-03-20 | Stop reason: HOSPADM

## 2017-03-17 RX ORDER — ZOLPIDEM TARTRATE 5 MG/1
10 TABLET ORAL
Status: DISCONTINUED | OUTPATIENT
Start: 2017-03-17 | End: 2017-03-20 | Stop reason: HOSPADM

## 2017-03-17 RX ORDER — ZOLPIDEM TARTRATE 5 MG/1
5 TABLET ORAL
Status: DISCONTINUED | OUTPATIENT
Start: 2017-03-17 | End: 2017-03-17

## 2017-03-17 RX ORDER — GABAPENTIN 300 MG/1
300 CAPSULE ORAL 3 TIMES DAILY
Status: DISCONTINUED | OUTPATIENT
Start: 2017-03-17 | End: 2017-03-20 | Stop reason: HOSPADM

## 2017-03-17 RX ADMIN — FLUOXETINE 40 MG: 20 CAPSULE ORAL at 08:30

## 2017-03-17 RX ADMIN — Medication 2 CAPSULE: at 08:30

## 2017-03-17 RX ADMIN — CLONIDINE HYDROCHLORIDE 0.1 MG: 0.1 TABLET ORAL at 12:07

## 2017-03-17 RX ADMIN — HYDROXYZINE PAMOATE 25 MG: 25 CAPSULE ORAL at 12:09

## 2017-03-17 RX ADMIN — BUDESONIDE AND FORMOTEROL FUMARATE DIHYDRATE 2 PUFF: 160; 4.5 AEROSOL RESPIRATORY (INHALATION) at 08:35

## 2017-03-17 RX ADMIN — LACOSAMIDE 200 MG: 50 TABLET, FILM COATED ORAL at 20:21

## 2017-03-17 RX ADMIN — LACOSAMIDE 200 MG: 50 TABLET, FILM COATED ORAL at 08:30

## 2017-03-17 RX ADMIN — ZOLPIDEM TARTRATE 10 MG: 5 TABLET ORAL at 20:21

## 2017-03-17 RX ADMIN — METHOCARBAMOL 750 MG: 500 TABLET ORAL at 18:58

## 2017-03-17 RX ADMIN — ACETAMINOPHEN 650 MG: 325 TABLET ORAL at 16:31

## 2017-03-17 RX ADMIN — DIVALPROEX SODIUM 1500 MG: 500 TABLET, FILM COATED, EXTENDED RELEASE ORAL at 08:30

## 2017-03-17 RX ADMIN — BUDESONIDE AND FORMOTEROL FUMARATE DIHYDRATE 2 PUFF: 160; 4.5 AEROSOL RESPIRATORY (INHALATION) at 20:22

## 2017-03-17 RX ADMIN — Medication 2 CAPSULE: at 12:07

## 2017-03-17 RX ADMIN — LORAZEPAM 2 MG: 1 TABLET ORAL at 09:20

## 2017-03-17 RX ADMIN — DULOXETINE HYDROCHLORIDE 60 MG: 60 CAPSULE, DELAYED RELEASE ORAL at 08:31

## 2017-03-17 RX ADMIN — LORAZEPAM 2 MG: 1 TABLET ORAL at 13:14

## 2017-03-17 RX ADMIN — CLONIDINE HYDROCHLORIDE 0.1 MG: 0.1 TABLET ORAL at 08:30

## 2017-03-17 RX ADMIN — GABAPENTIN 300 MG: 300 CAPSULE ORAL at 13:14

## 2017-03-17 RX ADMIN — GABAPENTIN 300 MG: 300 CAPSULE ORAL at 20:21

## 2017-03-17 RX ADMIN — CLONIDINE HYDROCHLORIDE 0.1 MG: 0.1 TABLET ORAL at 18:12

## 2017-03-17 NOTE — BSMART NOTE
ACTIVITIES THERAPY PROGRESS NOTE    Group time:1530    The patient declined group. Not feeling well. In bed.

## 2017-03-17 NOTE — BSMART NOTE
GROUP THERAPY PROGRESS NOTE    Andi Mason is participating in [place].      Group time: 30 minutes    Personal goal for participation: discuss guideline compliance, unit issues and community announcements; discuss post discharge plans/goals    Goal orientation: community    Group therapy participation: active

## 2017-03-17 NOTE — H&P
BEHAVIORAL HEALTH ADMISSION NOTE    Patient: Maria Esther Lagunas               Sex: female          DOA: 3/16/2017       YOB: 1965      Age:  46 y.o. HISTORY OF PRESENT ILLNESS:       CC: Depression and SI.     HPI:  The patient is a 46years old single, unemployed, domicile (lives with her friend) WF w/ a PPHx of depression and polysubstance (cocaine, heroin, and marijuana) used. Per record patient was here in the [facility] ED a few days ago but eloped. On this admission patient was admitted under TDO status for depression and SI. Per records patient presented to the ED for drugs detox. Patient reports that \"for the past three days I've been having pain all over, I'm a heroin addict, and I need help getting off of it. \" Patient stated that she is an IV heroine user and would like help to stop using. She stated that her last use was last night. She also admits to marijuana use but denies any other illicit drug or alcohol use. Patient stated she has been using heroin IV since 6years old. She reported that she had a seizure 2 weeks ago. At present, she states she is not suicidal at this time but desperately would like to stop using heroin. She also denies any thoughts to harm self or others. She denies manic-depressive symptoms just depressive symptoms. She also denies paranoid delusion,  A/V/H, AND H/S/I/P. She is willing to comply w/ treatment plan in order to improve her opiate w/d symptoms and psychiatric symptoms. Active Problems:    Depression (8/27/2012)         Past Medical History:   Diagnosis Date    Chronic obstructive pulmonary disease ([address] 7/25/2016    severe. PFT confirmed.  June 2016    COPD     Cord compression [facility])     Dr Xenia Dang Current smoker 7/25/2016    Depression     Hepatitis C antibody test positive 6/29/2015    Negative viral load, Immune    History of cocaine abuse     History of heroin abuse     Hypercholesterolemia     Other diseases of lung, not elsewhere classified     pneumonia    Seizures ([facility] 2007    complex partial, unk etiology; Dr. Crystal Delatorre. [facility], Neurology-    Spinal stenosis     Thyroid disease     many years per pt    Tobacco use         Past Surgical History:   Procedure Laterality Date    HX CERVICAL DISKECTOMY      HX  SECTION      HX GYN            HX TUBAL LIGATION         Social History   Substance Use Topics    Smoking status: Current Every Day Smoker     Packs/day: 0.25     Years: 35.00     Types: Cigarettes    Smokeless tobacco: Never Used    Alcohol use No        Family History   Problem Relation Age of Onset    Cancer Father 48     lung    Lung Disease Father     Cancer Maternal Grandmother      lung    Lung Disease Maternal Grandmother         Allergies   Allergen Reactions    Ampicillin Anaphylaxis    Penicillin G Anaphylaxis    Adhesive Tape-Silicones Rash    Tegretol [Carbamazepine] Rash        Prior to Admission medications    Medication Sig Start Date End Date Taking? Authorizing Provider   divalproex ER (DEPAKOTE ER) 500 mg ER tablet Take 3 Tabs by mouth daily. 17   Dorian Fam, NP   FLUoxetine (PROZAC) 40 mg capsule Take 1 Cap by mouth daily. Indications: ANXIETY WITH DEPRESSION 17   Magalene Walland, NP   omega-3 acid ethyl esters (LOVAZA) 1 gram capsule Take 2 Caps by mouth daily (with breakfast). Indications: HYPERTRIGLYCERIDEMIA 16   [place] Candnikia NP   budesonide-formoterol [facility]) 160-4.5 mcg/actuation HFA inhaler Take 2 Puffs by inhalation two (2) times a day. 16   Keonmaryam Rogers, NP   lacosamide (VIMPAT) 200 mg tab tablet Take 1 Tab by mouth two (2) times a day. Indications: PARTIAL EPILEPSY TREATMENT ADJUNCT 16   Keon Rogers NP   DULoxetine (CYMBALTA) 60 mg capsule Take 1 Cap by mouth daily.  16   [place] Candnikia, NP   albuterol (VENTOLIN HFA) 90 mcg/actuation inhaler Take 2 Puffs by inhalation every four (4) hours as needed for Wheezing or Shortness of Breath (Cough). Indications: CHRONIC OBSTRUCTIVE PULMONARY DISEASE 7/25/16   Danny Keita, RUDOLPH       VITALS:    Visit Vitals    /87 (BP 1 Location: Right arm, BP Patient Position: Sitting)    Pulse 73    Temp 97.1 °F (36.2 °C)    Resp 20    Wt 45.7 kg (100 lb 11.2 oz)    LMP 07/29/2012    SpO2 98%    BMI 19.03 kg/m2       Labs: Reviewed and in chart  PSYCHIATRIC HISTORY:  DIAGNOSIS: Depression and polysubstance  CURRENT PSYCHIATRIST: JOHNIE  THERAPIST: TBD  ADMISSIONS: None reported  SUICIDE ATTEMPTS: None reported      REVIEW OF SYSTEMS:     GENERAL:Patient alert, awake and oriented times 3, able to communicate full sentences and not in distress. HEENT: No change in vision, no earache, tinnitus, sore throat or sinus congestion. NECK: No pain or stiffness. PULMONARY: No shortness of breath, cough or wheeze. GASTROINTESTINAL: No abdominal pain, nausea, vomiting or diarrhea, melena or bright red blood per rectum. GENITOURINARY: No urinary frequency, urgency, hesitancy or dysuria. MUSCULOSKELETAL: No joint or muscle pain, no back pain, no recent trauma. DERMATOLOGIC: No rash, no itching, no lesions. ENDOCRINE: No polyuria, polydipsia, no heat or cold intolerance. No recent change in weight. HEMATOLOGICAL: No anemia or easy bruising or bleeding. \NEUROLOGIC: No headache, seizures, numbness, tingling or weakness. \denies f/c, pain, n/v, d/c, SOB, CP, weakness/numbness, difficulty urinating    MINI MENTAL STATUS EXAM: :   Orientation- Oriented in all spheres  Short-term memory: shows no evidence of impairment  Attention:Normal  Repeat phrase \"no ifs, ands, or buts. \"  Follow three stage command- follow written command (CLOSE YOUR EYES)\- write a spontaneous sentence  Copy a simple design      MENTAL STATUS EXAM:  Appearance:shows no evidence of impairment  Behavior: shows no evidence of impairment  Motor: within normal limits  Speech: is slowed and is soft  Mood: anxious and depressed  Affect: anxious and depressed  Thought Process: shows no evidence of impairment  Thought Content: no evidence of impairment  Perception:None  Cognition:  appropriate decision making  Insight: The patient's insight is blaming  Judgment: is psychologically impaired    RISK ASSESSMENT:   Prior Attempts: NO  Lethality of Attempts: NO  Weapons at [address] at Home: NO  Alcohol/Drug Use: YES  Protective Factors:contacts reliable for safety, denies intent, no organized plan and no means/access to weapons      ASSESSMENT: The patient is a 46years old single, unemployed, domicile (lives with her friend) WF w/ a PPHx of depression and polysubstance (cocaine, heroin, and marijuana) used. Per record patient was here in the [facility] ED a few days ago but eloped. On this admission patient was admitted under TDO status for depression and SI. Per records patient presented to the ED for drugs detox. Patient reports that \"for the past three days I've been having pain all over, I'm a heroin addict, and I need help getting off of it. \" Patient stated that she is an IV heroine user and would like help to stop using. She stated that her last use was last night. She also admits to marijuana use but denies any other illicit drug or alcohol use. Patient stated she has been using heroin IV since 6years old. At present, she states she is not suicidal at this time but desperately would like to stop using heroin. Axis I: MDD R/S w/o P/F vs Depression due to substance induced. Polysustance use disorder (heroine/marijuana)  Axis II: Deferred  Axis II: Sz, COPD, Cord compression, Hep C, Hyperlipidemia, spinal stenosis, Thyroid disease  Axis IV: Poor support, Chronic drugs used, Homeless, Jobless  Axis V: GAF: 30     Plan:  1. Continue with inpatient psychiatric treatment  2. Continue with suicide or assault precautions  3. Patient is to continue with Art/OT and family therapy sessions  4.  Will need to talk with outpatient psychiatrist/therapist for more collateral  5. -Treatment plan: Restart all home medical medications and consult medicine if possible.  -Patient voices understanding of the risk, benefit, alternative treatment, and the risk of no treatment.   -Patient consents and willing to comply with treatment.   -Start: Clonidine taper protocol for 3 days and hold clonidine if BP is <100/60  -Psychotropic medications:  -1. Restart: Prozac 40 mg PO qAM  -2: Restart: Cymbalta 60 mg PO qAM  -3. Start: Gabapentin 300 mg PO TID  -4. Start: Ambien 10 mg PO qHS    6. Labs: None necessary at this time.   7. SW to help with disposition    EST LOS: 3-5 DAYS    Disposition:  Home w/Family           ___________________________________________________    Attending Physician: Avila Reddy MD     ALLERGIES:   Allergies   Allergen Reactions    Ampicillin Anaphylaxis    Penicillin G Anaphylaxis    Adhesive Tape-Silicones Rash    Tegretol [Carbamazepine] Rash       SUBSTANCE USE:opiates    Patient Vitals for the past 24 hrs:   Temp Pulse Resp BP SpO2   03/16/17 2000 97.1 °F (36.2 °C) 73 20 124/87 -   03/16/17 1629 98.2 °F (36.8 °C) 94 16 119/74 -   03/16/17 1229 98.4 °F (36.9 °C) 97 16 124/83 98 %

## 2017-03-17 NOTE — H&P
History and Physical        Patient: Dana Velasquez               Sex: female          DOA: 3/16/2017         YOB: 1965      Age:  46 y.o.        LOS:  LOS: 1 day        HPI:     Dana Velasquez is a 46 y.o. female who was admitted experiencing depression, suicidal ideation and poly substance  dependence. Active Problems:    Depression (2012)        Past Medical History:   Diagnosis Date    Chronic obstructive pulmonary disease ([facility] 2016    severe. PFT confirmed. 2016    COPD     Cord compression [facility])     Dr Marcia Hahn Current smoker 2016    Depression     Hepatitis C antibody test positive 2015    Negative viral load, Immune    History of cocaine abuse     History of heroin abuse     Hypercholesterolemia     Other diseases of lung, not elsewhere classified     pneumonia    [facility]     complex partial, unk etiology; Dr. Ronan Dewitt. [facility], Neurology-    Spinal stenosis     Thyroid disease     many years per pt    Tobacco use        Past Surgical History:   Procedure Laterality Date    HX CERVICAL DISKECTOMY      HX  SECTION      HX GYN            HX TUBAL LIGATION         Family History   Problem Relation Age of Onset    Cancer Father 48     lung    Lung Disease Father     Cancer Maternal Grandmother      lung    Lung Disease Maternal Grandmother        Social History     Social History    Marital status: SINGLE     Spouse name: N/A    Number of children: 2    Years of education: 10     Social History Main Topics    Smoking status: Current Every Day Smoker     Packs/day: 0.25     Years: 35.00     Types: Cigarettes    Smokeless tobacco: Never Used    Alcohol use No    Drug use: 7.00 per week     Special: Heroin, Marijuana      Comment:  states it has been awhile since she did Heroin.     Sexual activity: Yes     Partners: Male     Birth control/ protection: Condom     Other Topics Concern     Service No    Blood Transfusions No    Caffeine Concern No    Occupational Exposure No    Hobby Hazards No    Sleep Concern No    Stress Concern No    Weight Concern No    Special Diet No    Back Care No    Exercise No    Bike Helmet No    Seat Belt Yes    Self-Exams No     Social History Narrative   Patient states she's homeless. States appetite and sleep have been poor. She is unemployed. Prior to Admission medications    Medication Sig Start Date End Date Taking? Authorizing Provider   divalproex ER (DEPAKOTE ER) 500 mg ER tablet Take 3 Tabs by mouth daily. 2/28/17   Maia Moon NP   FLUoxetine (PROZAC) 40 mg capsule Take 1 Cap by mouth daily. Indications: ANXIETY WITH DEPRESSION 2/27/17   Erick Zhu NP   omega-3 acid ethyl esters (LOVAZA) 1 gram capsule Take 2 Caps by mouth daily (with breakfast). Indications: HYPERTRIGLYCERIDEMIA 7/25/16   Juan Alberto Kenyon NP   budesonide-formoterol [facility]) 160-4.5 mcg/actuation HFA inhaler Take 2 Puffs by inhalation two (2) times a day. 7/25/16   Juan Alberto Kenyon NP   lacosamide (VIMPAT) 200 mg tab tablet Take 1 Tab by mouth two (2) times a day. Indications: PARTIAL EPILEPSY TREATMENT ADJUNCT 7/25/16   Juan Alberto Kenyon NP   DULoxetine (CYMBALTA) 60 mg capsule Take 1 Cap by mouth daily. 7/25/16   Juan Alberto Kenyon NP   albuterol (VENTOLIN HFA) 90 mcg/actuation inhaler Take 2 Puffs by inhalation every four (4) hours as needed for Wheezing or Shortness of Breath (Cough). Indications: CHRONIC OBSTRUCTIVE PULMONARY DISEASE 7/25/16   Juan Alberto Kenyon NP       Allergies   Allergen Reactions    Ampicillin Anaphylaxis    Penicillin G Anaphylaxis    Adhesive Tape-Silicones Rash    Tegretol [Carbamazepine] Rash       Review of Systems  A comprehensive review of systems was negative.       Physical Exam:      Visit Vitals    /87 (BP 1 Location: Right arm, BP Patient Position: Sitting)    Pulse 73    Temp 97.1 °F (36.2 °C)    Resp 20    Wt 100 lb 11.2 oz (45.7 kg)  SpO2 98%    BMI 19.03 kg/m2       Physical Exam:    General:  Alert, cooperative, well nourished, well developed  female, no distress, appears stated age. Eyes:  Conjunctivae/corneas clear. PERRL, EOMs intact. Fundi benign   Ears:  Normal TMs and external ear canals both ears. Nose: Nares normal. Septum midline. Mucosa normal. No drainage or sinus tenderness. Mouth/Throat: Lips, mucosa, and tongue normal. Upper dentures, lower few and rotted, gums normal.   Neck: Supple, symmetrical, trachea midline, no adenopathy, thyroid: no enlargement/tenderness/nodules, no carotid bruit and no JVD. Back:   Symmetric, no curvature. ROM normal. No CVA tenderness. Lungs:   Clear to auscultation bilaterally. Heart:  Regular rate and rhythm, S1, S2 normal, no murmur, click, rub or gallop. Abdomen:   Soft, non-tender. Bowel sounds normal. No masses,  No organomegaly. Extremities: Extremities normal, atraumatic, no cyanosis or edema. Pulses: 2+ and symmetric all extremities. Skin: Skin color, texture, turgor normal. No rashes or lesions   Lymph nodes: Cervical, supraclavicular, and axillary nodes normal.   Neurologic: CNII-XII intact. Normal strength, sensation and reflexes throughout.            Assessment/Plan     Depression  Suicidal ideation  Polysubstance abuse  Labs reviewed (+THC, Cocaine, Opiates)  Continue treatment per physician's orders

## 2017-03-17 NOTE — PROGRESS NOTES
Dr. Jose Dennis made aware of patient's temp 100.8; orders received; Tylenol 650 mg given po for temp; will monitor as needed.

## 2017-03-17 NOTE — PROGRESS NOTES
Problem: Falls - Risk of  Goal: *Absence of falls  Patient will have no falls daily while in hospital   Outcome: Progressing Towards Goal  Patient remains free of falls while hospitalized,     Problem: Depressed Mood (Adult/Pediatric)  Goal: *STG: Participates in treatment plan  Patient will be an active participant daily while in hospital   Outcome: Progressing Towards Goal  Patient interacts with staff and peers. Goal: *STG: Complies with medication therapy  Participant will take prescribed medication daily while in hospital   Outcome: Progressing Towards Goal  Patient takes medications as ordered. Comments:   Patient has been mainly in bed today but does come out and eat meals. Patient has spent a little time after meals in the milieu socializing with peers and staff. Patient is compliant with medications and has been at the desk requesting something to help with her tremors. Patient has been given Ativan 2mg twice during this shift which seems to help patient rest a little, but she is still anxious and agitated at times. Patient denies SI/HI and hallucinations. Patient is more pleasant and calm today and appears to be feeling a little better but does want medcation \"for my nerves\".

## 2017-03-17 NOTE — BH NOTES
GROUP THERAPY PROGRESS NOTE    Mikayla Hu is not participating in Substance abuse. Group time: 30 minutes    Personal goal for participation: The Pt will be introduce [employer] [facility] Program and the different things they offer. Goal orientation: social    Group therapy participation: The Pt did not participate. Therapeutic interventions reviewed and discussed:     Impression of participation: The Pt was not feeling well and ask to remain in bed.

## 2017-03-17 NOTE — BSMART NOTE
SW SESSION: The SW met with the patient to assess her needs. The patient is a 46year old  female that is  from her spouse. She stated that although they still communicate he is not supportive and he resides in [place]. She has two adult sons of which she affirms that they have a good relationship. She denied current thoughts of self-harm, SI/HI, intent, AVH, and concerns regarding her health. The patient disclosed that she is seeking substance abuse treatment for a heroine addiction; age of onset was 6. She denied alcohol and other illicit substance use, past suicide attempts, outpatient substance abuse treatment, and mental and physical health challenges/concerns. She reported two prior instances of inpatient substance abuse treatment (with successful completion) in [place] (however, she could not recall the name of the facility). The patient disclosed that she resides in [place] with a friend and her friends boyfriend; is unemployed but recieves $490 in "[employer]" and 194. In food stamp benefits. She is not seeking any additional services. The SW will make an intake appointment for substance abuse treatment at [facility]. The patient shared that she is also willing to attend NA meetings; the SW will provide the patient with a list of meeting locations in her area.

## 2017-03-17 NOTE — BSMART NOTE
KADEEM COLLATERAL: KADEEM made an intake appointment for substance abuse treatment for the patient at [facility] on 3/24/17 at 2:30 pm. The address is [address] [place], [place] [address]lui Caldera The contact number is [phone] / [phone].

## 2017-03-18 PROCEDURE — 65220000003 HC RM SEMIPRIVATE PSYCH

## 2017-03-18 PROCEDURE — 74011250637 HC RX REV CODE- 250/637: Performed by: PSYCHIATRY & NEUROLOGY

## 2017-03-18 PROCEDURE — 74011000250 HC RX REV CODE- 250: Performed by: PSYCHIATRY & NEUROLOGY

## 2017-03-18 RX ORDER — POLYETHYLENE GLYCOL 3350 17 G/17G
17 POWDER, FOR SOLUTION ORAL
Status: DISCONTINUED | OUTPATIENT
Start: 2017-03-18 | End: 2017-03-20 | Stop reason: HOSPADM

## 2017-03-18 RX ORDER — CLONIDINE HYDROCHLORIDE 0.1 MG/1
0.1 TABLET ORAL 2 TIMES DAILY
Status: DISPENSED | OUTPATIENT
Start: 2017-03-19 | End: 2017-03-20

## 2017-03-18 RX ORDER — ACETAMINOPHEN 500 MG
2 TABLET ORAL
Status: DISCONTINUED | OUTPATIENT
Start: 2017-03-18 | End: 2017-03-19

## 2017-03-18 RX ADMIN — LACOSAMIDE 200 MG: 50 TABLET, FILM COATED ORAL at 08:40

## 2017-03-18 RX ADMIN — ZOLPIDEM TARTRATE 10 MG: 5 TABLET ORAL at 20:09

## 2017-03-18 RX ADMIN — CLONIDINE HYDROCHLORIDE 0.1 MG: 0.1 TABLET ORAL at 13:06

## 2017-03-18 RX ADMIN — ALBUTEROL SULFATE 2.5 MG: 2.5 SOLUTION RESPIRATORY (INHALATION) at 15:28

## 2017-03-18 RX ADMIN — LORAZEPAM 1 MG: 1 TABLET ORAL at 18:19

## 2017-03-18 RX ADMIN — Medication 2 CAPSULE: at 08:40

## 2017-03-18 RX ADMIN — LORAZEPAM 2 MG: 1 TABLET ORAL at 14:00

## 2017-03-18 RX ADMIN — LACOSAMIDE 200 MG: 50 TABLET, FILM COATED ORAL at 20:09

## 2017-03-18 RX ADMIN — DIVALPROEX SODIUM 1500 MG: 500 TABLET, FILM COATED, EXTENDED RELEASE ORAL at 08:40

## 2017-03-18 RX ADMIN — IBUPROFEN 600 MG: 600 TABLET, FILM COATED ORAL at 08:40

## 2017-03-18 RX ADMIN — Medication 2 CAPSULE: at 16:27

## 2017-03-18 RX ADMIN — LORAZEPAM 2 MG: 1 TABLET ORAL at 09:25

## 2017-03-18 RX ADMIN — Medication 2 CAPSULE: at 11:46

## 2017-03-18 RX ADMIN — ACETAMINOPHEN 650 MG: 325 TABLET ORAL at 16:07

## 2017-03-18 RX ADMIN — GABAPENTIN 300 MG: 300 CAPSULE ORAL at 20:09

## 2017-03-18 RX ADMIN — METHOCARBAMOL 750 MG: 500 TABLET ORAL at 08:40

## 2017-03-18 RX ADMIN — HYDROXYZINE PAMOATE 25 MG: 25 CAPSULE ORAL at 12:02

## 2017-03-18 RX ADMIN — BUDESONIDE AND FORMOTEROL FUMARATE DIHYDRATE 2 PUFF: 160; 4.5 AEROSOL RESPIRATORY (INHALATION) at 20:12

## 2017-03-18 RX ADMIN — CLONIDINE HYDROCHLORIDE 0.1 MG: 0.1 TABLET ORAL at 08:41

## 2017-03-18 RX ADMIN — GABAPENTIN 300 MG: 300 CAPSULE ORAL at 13:06

## 2017-03-18 RX ADMIN — POLYETHYLENE GLYCOL 3350 17 G: 17 POWDER, FOR SOLUTION ORAL at 13:33

## 2017-03-18 RX ADMIN — GABAPENTIN 300 MG: 300 CAPSULE ORAL at 08:40

## 2017-03-18 RX ADMIN — NICOTINE POLACRILEX 2 MG: 2 GUM, CHEWING ORAL at 18:13

## 2017-03-18 RX ADMIN — BUDESONIDE AND FORMOTEROL FUMARATE DIHYDRATE 2 PUFF: 160; 4.5 AEROSOL RESPIRATORY (INHALATION) at 08:47

## 2017-03-18 RX ADMIN — HYDROXYZINE PAMOATE 25 MG: 25 CAPSULE ORAL at 16:27

## 2017-03-18 NOTE — BH NOTES
Patient was given muscle relaxer for complaint of muscle spasms and muscle pain. Will continue to monitor for effectiveness. Patient educated on possible side effects related to taking muscle relaxer with other PRN and scheduled medications patient has ordered. Patient agrees to alert staff if feelings of dizziness, nausea, vomiting or increased tiredness and requires assistance back to bed. Patient is currently in day room at table closest to nurse's station talking to peers. Will continue to monitor and provide safe and therapeutic environment.

## 2017-03-18 NOTE — BH NOTES
GROUP THERAPY PROGRESS NOTE    Andi Mason is participating in [place]. Group time: 25 minutes    Personal goal for participation: rules    Goal orientation: community    Group therapy participation: active    Therapeutic interventions reviewed and discussed: She did not have any concerns during group    Impression of participation: She was alert and oriented during group receptive to the rules of  unit.

## 2017-03-18 NOTE — PROGRESS NOTES
Patient given Tylenol 650 mg po for Temp 100.7; also, COWS score=4, Vistaril 25 mg given po for anxiety. During round this shift, Dr. Feliciano Smith made aware of need to re-evaluate patient in order to resume Prozac, Cymbalta, and Haldol.

## 2017-03-18 NOTE — BH NOTES
GROUP THERAPY PROGRESS NOTE    Elia Boas is participating in [place].      Group time: 30 minutes    Personal goal for participation: Participated in group     Goal orientation: community    Group therapy participation: active    Therapeutic interventions reviewed and discussed: unit rules and announcements    Impression of participation: Patient was engaged in group

## 2017-03-18 NOTE — BH NOTES
Pt was agitated when meeting this writer at the beginning of the shift. Pt stated that she wanted a grape soda and wanted this writer to go to the vending machine to retrieve one for her. This writer explained the guidelines for the unit and stated that I would find her grape juice. Once Pt received the grape juice, she was very grateful and in a much better mood for the duration of the shift. Pt temperature was 100.8 degrees when taken at 1600 and decreased to 99.2 degrees at 2000. Will continue to monitor. Pt taken down for x-rays of her chest earlier this evening-will track prognosis. Pt ate 90% of her dinner meal, along with various snacks. Pt declined group, due to not feeling well. Pt contracts for safety- no S/I nor H/I at this time. Pt wears non-skid socks.

## 2017-03-18 NOTE — BH NOTES
Sheila Goetz is  participating in [place]. Group time: 15 minutes    Personal goal for participation: Community announcement    Goal orientation: community    Group therapy participation: fully participated    Therapeutic interventions reviewed and discussed: Staff encouraged Pt. To report any maintenance/housekeeping or community concerns to staff so it can be addressed. Impression of participation: Pt. Did not have any maintenance/housekeeping or community concerns to report to staff .

## 2017-03-18 NOTE — PROGRESS NOTES
Problem: Opiate Withdrawal  Goal: *STG: Seeks staff when symptoms of withdrawal increase  Outcome: Progressing Towards Goal  Patient is progressing as evidence by coming to this nurse when symptoms of withdrawal became overwhelming. Goal: *STG: Maintains appropriate nutrition and hydration  Outcome: Progressing Towards Goal  Patient is progressing as evidence by eating all meals and compliance with water intake. Patient voices understanding of importance of nutrition and hydration. Patient continues to demonstrate focus on medications and next time she can \"get meds. \"  Patient was educated on taking muscle relaxer with motrin for maximum effectiveness. Patient had complaint of constipation and was ordered PRN Miralax. Patient instructed to alert Dr if not effective. Patient continues to demonstrate irritability, agitation and has had complaints of pain. Patient made aware that Dr will \"most likely not be prescribing Ativan at time of discharge. \"  Patient consistently wears non-skid footwear while ambulating. Patient has eaten all meals and snacks and has been compliant with medications. Will continue to monitor and provide interventions as needed.

## 2017-03-19 PROCEDURE — 65220000003 HC RM SEMIPRIVATE PSYCH

## 2017-03-19 PROCEDURE — 74011250637 HC RX REV CODE- 250/637: Performed by: PSYCHIATRY & NEUROLOGY

## 2017-03-19 RX ORDER — IBUPROFEN 200 MG
1 TABLET ORAL DAILY
Status: DISCONTINUED | OUTPATIENT
Start: 2017-03-19 | End: 2017-03-20 | Stop reason: HOSPADM

## 2017-03-19 RX ADMIN — GABAPENTIN 300 MG: 300 CAPSULE ORAL at 13:44

## 2017-03-19 RX ADMIN — ACETAMINOPHEN 650 MG: 325 TABLET ORAL at 00:25

## 2017-03-19 RX ADMIN — IBUPROFEN 600 MG: 600 TABLET, FILM COATED ORAL at 15:29

## 2017-03-19 RX ADMIN — BUDESONIDE AND FORMOTEROL FUMARATE DIHYDRATE 2 PUFF: 160; 4.5 AEROSOL RESPIRATORY (INHALATION) at 08:56

## 2017-03-19 RX ADMIN — DULOXETINE HYDROCHLORIDE 60 MG: 60 CAPSULE, DELAYED RELEASE ORAL at 09:07

## 2017-03-19 RX ADMIN — Medication 2 CAPSULE: at 09:04

## 2017-03-19 RX ADMIN — LORAZEPAM 2 MG: 1 TABLET ORAL at 10:54

## 2017-03-19 RX ADMIN — BUDESONIDE AND FORMOTEROL FUMARATE DIHYDRATE 2 PUFF: 160; 4.5 AEROSOL RESPIRATORY (INHALATION) at 19:40

## 2017-03-19 RX ADMIN — NICOTINE POLACRILEX 2 MG: 2 GUM, CHEWING ORAL at 08:31

## 2017-03-19 RX ADMIN — METHOCARBAMOL 750 MG: 500 TABLET ORAL at 15:29

## 2017-03-19 RX ADMIN — CLONIDINE HYDROCHLORIDE 0.1 MG: 0.1 TABLET ORAL at 09:04

## 2017-03-19 RX ADMIN — Medication 2 CAPSULE: at 16:13

## 2017-03-19 RX ADMIN — Medication 2 CAPSULE: at 12:30

## 2017-03-19 RX ADMIN — IBUPROFEN 600 MG: 600 TABLET, FILM COATED ORAL at 10:54

## 2017-03-19 RX ADMIN — GABAPENTIN 300 MG: 300 CAPSULE ORAL at 09:05

## 2017-03-19 RX ADMIN — GABAPENTIN 300 MG: 300 CAPSULE ORAL at 20:19

## 2017-03-19 RX ADMIN — ZOLPIDEM TARTRATE 10 MG: 5 TABLET ORAL at 20:20

## 2017-03-19 RX ADMIN — DIVALPROEX SODIUM 1500 MG: 500 TABLET, FILM COATED, EXTENDED RELEASE ORAL at 09:05

## 2017-03-19 RX ADMIN — METHOCARBAMOL 750 MG: 500 TABLET ORAL at 09:45

## 2017-03-19 RX ADMIN — LACOSAMIDE 200 MG: 50 TABLET, FILM COATED ORAL at 20:19

## 2017-03-19 RX ADMIN — LACOSAMIDE 200 MG: 50 TABLET, FILM COATED ORAL at 09:05

## 2017-03-19 NOTE — BH NOTES
GROUP THERAPY PROGRESS NOTE    Paloma Krishnamurthy is not participating in [facility]. Group time: 30 minutes    Personal goal for participation:  Positive changes    Goal orientation: passive    Group therapy participation: refuse    Therapeutic interventions reviewed and discussed: Staff encouraged Pt. To think positive    Impression of participation: Pt.  Refuse, chose to rest in bed despite staff encouragement

## 2017-03-19 NOTE — BH NOTES
GROUP THERAPY PROGRESS NOTE    Estelle Perez is participating in [place].      Group time: 20 minutes    Personal goal for participation: talk w\md    Goal orientation: community    Group therapy participation: active    Therapeutic interventions reviewed and discussed: rules and regulation

## 2017-03-19 NOTE — BH NOTES
GROUP THERAPY PROGRESS NOTE    Cain Barfield is not participating in Recreational Therapy.      Group time: 30 minutes    Personal goal for participation: have fun and relax    Goal orientation: recreation    Group therapy participation: refused    Therapeutic interventions reviewed and discussed: encouraged by staff to attend     Impression of participation: pt refused, chose to stay on the unit

## 2017-03-19 NOTE — BH NOTES
Patient given PRN Ativan 2mg tab PO due to increase in irritability and demonstrating inability to use coping skills and distraction techniques regarding uncomfortable physical/mental feelings from heroin withdrawal. Patient encouraged by both nurses and was not open to patient teaching or distraction at time. Will continue to encourage and monitor for effectiveness.

## 2017-03-19 NOTE — BH NOTES
Psychiatry progress note     Chart reviewed, patient interviewed. Patient reports her body is still aching from heroin withdrawal. Patient wants to go home tomorrow. Not interested in substance abuse placement. Patient frustrated about cigarettes/nicotine withdrawal. Complained the gum isn't doing anything and wants to get back on the patch. Patient practically begging to be allowed to smoke. Says she is not motivated to stop smoking. Confronted patient about thought process/behaviors. Discussed concerns she could relapse after leaving the hospital and encouraged her to stay until withdrawal symptoms are resolved. Patient feels her support system including friends will prevent her relapse.     On exam, irritable. Does appear less uncomfortable today. No SI, HI or AVH. Insight and judgment fair.     Meds are Depakote ER 1500 qDay, Cymbalta 60 qDay and Prozac 40 qDay. Neurontin 300 TID and Ambien 10 qHS.     Assessment unspecified depressive disorder, substance induced mood disorder and polysubstance dependence. Stabilizing. Patient short sighted appears focused on medications, substances. High risk for relapse. Plan is to continue current treatment plan.

## 2017-03-19 NOTE — PROGRESS NOTES
Problem: Suicide/Homicide (Adult/Pediatric)  Goal: *STG/LTG: No longer expresses self destructive or suicidal/homicidal thoughts  Patient will no longer express thoughts to harm self or others daily while in hospital   Outcome: Not Progressing Towards Goal  Patient is not progressing as evidence by stating \"I don't [place] go hurt myself. I [place] go use some dope so I can STOP hurting. \"      Problem: Opiate Withdrawal  Goal: *STG/LTG: Relapse prevention plan in place to include housing/aftercare, leisure activities, and spirituality  Outcome: Not Progressing Towards Goal  Patient is not progressing as evidence by voicing \"I have no idea and this whole thing sucks more than I ever thought. \" when questioned further, patient voiced \"the pain, just being so uncomfortable and I just want a fucking cigarette. \"    Comments:   Patient continues to be focused on \"what meds I can get and when. \"  Patient continually complains of pain with no relief. Patient again reminded that the muscle relaxer will be more effective if you ask for your Motrin at the same time. Patient voices understanding but does not demonstrate understanding. Patient has eaten all meals and has taken all medications as prescribed and on schedule. Patient encouraged not to allow the heroin addiction to \"trick\" her into believing she is weak and cannot \"get through the withdrawal.\"  Patient replied \"I am weak. \"  Patient informed this nurse during last shift with patient that \"I'm here to get through this shit because I need to have surgery on my arm. I have'nt been able to use my hand and I can't have the surgery until I go through a detox program. They want me to get a written note that I went through a detox. \"  Patient reminded during this shift of the above statement and was able to process importance of \"seeing it thorough. \" patient encouraged to speak with Dr and  on Monday 03/20/2017 regarding the information/note needed for the surgery. Patient continues to state \"I'm Asenath Shannon go lay down\" however patient continues to demonstrate behaviors of restlessness and has been unable to rest in bed longer than approximately 15 minutes. Patient has not had elevated temperature during this shift. Will continue to monitor and provide safe environment with encouragement and interventions as needed.

## 2017-03-19 NOTE — BH NOTES
Haroonmonia Hemanthlencho is not participating in Coping Skills Group. Group time: 15 minutes    Personal goal for participation: Coping with stressors    Goal orientation: relaxation    Group therapy participation: Refused    Therapeutic interventions reviewed and discussed: Positive ways to cope with stress. Impression of participation: Did not participate in group.

## 2017-03-19 NOTE — BH NOTES
Psychiatry progress note    Chart reviewed, patient interviewed. Case D/W nursing staff. Patient stated she was hospitalized secondary to heroin withdrawal. Reports her last use was several days ago and her withdrawal is bad. Patient stated diarrhea is improved but she has some vomiting and feels achey all over. Patient motivated for sobriety. Says she plans to engage in NA meetings after discharge. Patient asking about increasing meds for withdrawal symptoms, asked about Xanax. Patient complained she feels depressed and anxious. On exam, uncomfortable and upset. +SI patient made vague threats (\"I'm going to kill myself\") in response to discussion about meds. No HI or AVH. Insight and judgment fair. Meds are Depakote ER 1500 qDay, Cymbalta 60 qDay and Prozac 40 qDay. Neurontin 300 TID and Ambien 10 qHS. Assessment unspecified depressive disorder, substance induced mood disorder and polysubstance dependence. Difficult to assess mood symptoms as in acute withdrawal. Patient benefited from brief supportive intervention today. Plan is to discontinue Prozac secondary to polypharmacy (SSRI+SNRI) and continue current treatment plan.

## 2017-03-20 VITALS
HEART RATE: 93 BPM | DIASTOLIC BLOOD PRESSURE: 73 MMHG | OXYGEN SATURATION: 98 % | WEIGHT: 100.7 LBS | RESPIRATION RATE: 18 BRPM | SYSTOLIC BLOOD PRESSURE: 104 MMHG | BODY MASS INDEX: 19.03 KG/M2 | TEMPERATURE: 97.9 F

## 2017-03-20 PROCEDURE — 74011250637 HC RX REV CODE- 250/637: Performed by: PSYCHIATRY & NEUROLOGY

## 2017-03-20 RX ORDER — ZOLPIDEM TARTRATE 10 MG/1
10 TABLET ORAL
Qty: 30 TAB | Refills: 0 | Status: SHIPPED | OUTPATIENT
Start: 2017-03-20 | End: 2017-04-19

## 2017-03-20 RX ORDER — DULOXETIN HYDROCHLORIDE 60 MG/1
60 CAPSULE, DELAYED RELEASE ORAL DAILY
Qty: 30 CAP | Refills: 0 | Status: SHIPPED | OUTPATIENT
Start: 2017-03-20 | End: 2017-03-23 | Stop reason: SDUPTHER

## 2017-03-20 RX ORDER — ALBUTEROL SULFATE 0.83 MG/ML
2.5 SOLUTION RESPIRATORY (INHALATION)
Qty: 24 EACH | Refills: 0 | Status: SHIPPED | OUTPATIENT
Start: 2017-03-20 | End: 2017-04-19

## 2017-03-20 RX ORDER — BUDESONIDE AND FORMOTEROL FUMARATE DIHYDRATE 160; 4.5 UG/1; UG/1
2 AEROSOL RESPIRATORY (INHALATION) 2 TIMES DAILY
Qty: 1 INHALER | Refills: 0 | Status: SHIPPED | OUTPATIENT
Start: 2017-03-20 | End: 2017-03-23 | Stop reason: SDUPTHER

## 2017-03-20 RX ORDER — LACOSAMIDE 200 MG/1
200 TABLET ORAL 2 TIMES DAILY
Qty: 60 TAB | Refills: 0 | Status: SHIPPED | OUTPATIENT
Start: 2017-03-20 | End: 2017-03-23 | Stop reason: SDUPTHER

## 2017-03-20 RX ORDER — DIVALPROEX SODIUM 500 MG/1
1500 TABLET, EXTENDED RELEASE ORAL DAILY
Qty: 90 TAB | Refills: 0 | Status: SHIPPED | OUTPATIENT
Start: 2017-03-20 | End: 2017-03-23 | Stop reason: SDUPTHER

## 2017-03-20 RX ORDER — GABAPENTIN 300 MG/1
300 CAPSULE ORAL 3 TIMES DAILY
Qty: 90 CAP | Refills: 0 | Status: SHIPPED | OUTPATIENT
Start: 2017-03-20 | End: 2017-04-19

## 2017-03-20 RX ADMIN — LACOSAMIDE 200 MG: 50 TABLET, FILM COATED ORAL at 08:04

## 2017-03-20 RX ADMIN — HYDROXYZINE PAMOATE 25 MG: 25 CAPSULE ORAL at 06:10

## 2017-03-20 RX ADMIN — LORAZEPAM 2 MG: 1 TABLET ORAL at 08:13

## 2017-03-20 RX ADMIN — BUDESONIDE AND FORMOTEROL FUMARATE DIHYDRATE 2 PUFF: 160; 4.5 AEROSOL RESPIRATORY (INHALATION) at 08:10

## 2017-03-20 RX ADMIN — GABAPENTIN 300 MG: 300 CAPSULE ORAL at 08:04

## 2017-03-20 RX ADMIN — IBUPROFEN 600 MG: 600 TABLET, FILM COATED ORAL at 06:10

## 2017-03-20 RX ADMIN — Medication 2 CAPSULE: at 08:04

## 2017-03-20 RX ADMIN — DULOXETINE HYDROCHLORIDE 60 MG: 60 CAPSULE, DELAYED RELEASE ORAL at 08:04

## 2017-03-20 RX ADMIN — DIVALPROEX SODIUM 1500 MG: 500 TABLET, FILM COATED, EXTENDED RELEASE ORAL at 08:03

## 2017-03-20 NOTE — DISCHARGE SUMMARY
[facility]  Discharge Summary     Patient ID:  Estelle Perez  [id number]  46 y.o.  1965    Admit date: 3/16/2017    Discharge date and time: 3/20/2017 and christina    Admission Diagnoses: Depression    Discharge Diagnoses: MDD R/S w/o P/F, Depression due to substance induced. Polysustance use disorder (heroine/marijuana)    Disposition: home    Discharged Condition: good    Past Medical History:   Diagnosis Date    Chronic obstructive pulmonary disease ([facility] [address] 2016    severe. PFT confirmed. 2016    COPD     Cord compression [facility])     Dr Kemar Retana Current smoker 2016    Depression     Hepatitis C antibody test positive 2015    Negative viral load, Immune    History of cocaine abuse     History of heroin abuse     Hypercholesterolemia     Other diseases of lung, not elsewhere classified     pneumonia    Seizures ([facility]     complex partial, unk etiology; Dr. Benjamin Orellana. Shari Benavides, Neurology-    Spinal stenosis     Thyroid disease     many years per pt    Tobacco use       Family History   Problem Relation Age of Onset    Cancer Father 48     lung    Lung Disease Father     Cancer Maternal Grandmother      lung    Lung Disease Maternal Grandmother       Social History   Substance Use Topics    Smoking status: Current Every Day Smoker     Packs/day: 0.25     Years: 35.00     Types: Cigarettes    Smokeless tobacco: Never Used    Alcohol use No     Past Surgical History:   Procedure Laterality Date    HX CERVICAL DISKECTOMY      HX  SECTION      HX GYN            HX TUBAL LIGATION        Prior to Admission medications    Medication Sig Start Date End Date Taking? Authorizing Provider   divalproex ER (DEPAKOTE ER) 500 mg ER tablet Take 3 Tabs by mouth daily. 17   Lowell Amos, NP   FLUoxetine (PROZAC) 40 mg capsule Take 1 Cap by mouth daily.  Indications: ANXIETY WITH DEPRESSION 17   Laverne Arroyo, RUDOLPH   omega-3 acid ethyl esters (LOVAZA) 1 gram capsule Take 2 Caps by mouth daily (with breakfast). Indications: HYPERTRIGLYCERIDEMIA 7/25/16   Valentín Prim, NP   budesonide-formoterol [facility]) 160-4.5 mcg/actuation HFA inhaler Take 2 Puffs by inhalation two (2) times a day. 7/25/16   Valentín Prim, NP   lacosamide (VIMPAT) 200 mg tab tablet Take 1 Tab by mouth two (2) times a day. Indications: PARTIAL EPILEPSY TREATMENT ADJUNCT 7/25/16   Valentín Prim, NP   DULoxetine (CYMBALTA) 60 mg capsule Take 1 Cap by mouth daily. 7/25/16   Valentín Prim, NP   albuterol (VENTOLIN HFA) 90 mcg/actuation inhaler Take 2 Puffs by inhalation every four (4) hours as needed for Wheezing or Shortness of Breath (Cough). Indications: CHRONIC OBSTRUCTIVE PULMONARY DISEASE 7/25/16   Valentín Prim, NP     Allergies   Allergen Reactions    Ampicillin Anaphylaxis    Penicillin G Anaphylaxis    Adhesive Tape-Silicones Rash    Tegretol [Carbamazepine] Rash        Hospital Course: The patient was admitted to the special treatment unit on 3/16/2017 precautions. The patient was engaged in individual, group, therapies, and occupational therapy. The patient was involved in chemical dependence educational classes and NA/AA. Initially patient was withdrawing from opiate and COWS clonidine protocol was initiated. Patient was also treated with ADM for her depression and mood instability. Patient reports that her psychiatric symptoms were improved w/ current treatment regimen. At the time of discharge, the patient denied homicidal or suicidal ideation and reports that all her w/d symptoms were improved. Patient  was not psychotic and was capable of self care and competent to make their own financial and medical decisions. The patient has an understanding of treatment recommendations and medication management on discharge. A safety plan was discussed and psychoeducation was also discussed regarding the detrimental consequences of using illicit drugs.       Discharge Exams:  Mental Status exam: WNL   Physical exam: No exam performed today, NO physical complaints. Chest X-Ray: none  ECG: None required    Lab/Data Review: All lab results for the last 24 hours reviewed. Consultations (including impressions and outcomes): None required    Psychiatric Testing: Reality based    Treatment and Response: Effective    Significant adverse reaction to drugs: none    Procedures/Operations: None    Discharge medications:  1. Symbicort 160-4.5 mcg/actuation HFA inhaler 2 Puffs BID  2. Depakote ER 1,500 mg PO daily  3. Cymbalta 60 mg PO qdaily  4. Gabapentin 300 mg PO TID  5. Vimpat 200 mg PO BID  6. Ambien 10 mg PO qHS    Activity: Activity as tolerated  Diet: Regular Diet    All f/u were arranged for the patient by the discharge planner at the time of discharge.     Signed:  Mohini Vazquez MD  3/20/2017  8:16 AM

## 2017-03-20 NOTE — BH NOTES
Patient received nicotine patch during evening shift. At nurses station throughout the shift asking for whatever she could get. Tolerated scheduled medication without difficulty. Denies any thoughts to harm self.

## 2017-03-20 NOTE — DISCHARGE INSTRUCTIONS
BEHAVIORAL HEALTH NURSING DISCHARGE NOTE      The following personal items collected during your admission are returned to you:   Dental Appliance: Dental Appliances: None  Vision: Visual Aid: None, Glasses  Hearing Aid:    Jewelry: Jewelry: None  Clothing: Clothing: Footwear, Belt, Jacket/Coat, Pants, Shirt, Socks, Undergarments, With patient  Other Valuables: Other Valuables: None  Valuables sent to safe:        PATIENT INSTRUCTIONS:        The discharge information has been reviewed with the patient. The patient verbalized understanding.       Patient armband removed and shredded

## 2017-03-20 NOTE — BH NOTES
[id number] patient requested and received motrin for generalized aches and vistaril for restlessness. Patient went back to bed after medication.

## 2017-03-20 NOTE — BH NOTES
Pt discharged after being dismissed from court. Pt denies SI. After care instructions reviewed with pt who verbalized understanding. Copy of after care instructions and prescriptions provided. Belongings and valuables returned. No concerns voiced.

## 2017-03-22 ENCOUNTER — TELEPHONE (OUTPATIENT)
Dept: FAMILY MEDICINE CLINIC | Age: 52
End: 2017-03-22

## 2017-03-23 LAB
BACTERIA SPEC CULT: NORMAL
BACTERIA SPEC CULT: NORMAL
SERVICE CMNT-IMP: NORMAL
SERVICE CMNT-IMP: NORMAL

## 2017-03-23 NOTE — TELEPHONE ENCOUNTER
Medication: Ventolin HFA, dose: 2 puffs, how often: every 4 hours as needed, current number of medication days provided: 90, refill per application. Lot #: [id number], EXP 03/2018. This medication was received and verified for the following 1. Correct Patient, 2. Correct Diagnosis, 3. Correct Drug, 4. Correct route, and no current allergy to medication. Medication: Depakote , dose: 3 cap, how often: qd , current number of medication days provided: 90, refill per application. Lot #: [id number], EXP 07/2017. This medication was received and verified for the following 1. Correct Patient, 2. Correct Diagnosis, 3. Correct Drug, 4. Correct route, and no current allergy to medication. Medication: Lovaza 1gm, dose: 2 caps, how often: qd, current number of medication days provided: 90, refill per application. Lot #: [id number], EXP 03/2018. This medication was received and verified for the following 1. Correct Patient, 2. Correct Diagnosis, 3. Correct Drug, 4. Correct route, and no current allergy to medication. Please contact patient to come  their medications.      Lisa Lyles, MSN,RN,[facility]

## 2017-03-27 ENCOUNTER — HOSPITAL ENCOUNTER (OUTPATIENT)
Dept: LAB | Age: 52
Discharge: HOME | End: 2017-03-27
Payer: MEDICAID

## 2017-03-27 DIAGNOSIS — R56.9 SEIZURE (HCC): Chronic | ICD-10-CM

## 2017-03-27 DIAGNOSIS — Z13.9 SCREENING: ICD-10-CM

## 2017-03-27 LAB
ALBUMIN SERPL BCP-MCNC: 3.4 G/DL (ref 3.4–5)
ALBUMIN/GLOB SERPL: 0.9 {RATIO} (ref 0.8–1.7)
ALP SERPL-CCNC: 63 U/L (ref 45–117)
ALT SERPL-CCNC: 18 U/L (ref 13–56)
ANION GAP BLD CALC-SCNC: 6 MMOL/L (ref 3–18)
AST SERPL W P-5'-P-CCNC: 16 U/L (ref 15–37)
BASOPHILS # BLD AUTO: 0 K/UL (ref 0–0.06)
BASOPHILS # BLD: 1 % (ref 0–2)
BILIRUB SERPL-MCNC: 0.2 MG/DL (ref 0.2–1)
BUN SERPL-MCNC: 19 MG/DL (ref 7–18)
BUN/CREAT SERPL: 27 (ref 12–20)
CALCIUM SERPL-MCNC: 9.3 MG/DL (ref 8.5–10.1)
CHLORIDE SERPL-SCNC: 103 MMOL/L (ref 100–108)
CO2 SERPL-SCNC: 31 MMOL/L (ref 21–32)
CREAT SERPL-MCNC: 0.71 MG/DL (ref 0.6–1.3)
DIFFERENTIAL METHOD BLD: ABNORMAL
EOSINOPHIL # BLD: 0.1 K/UL (ref 0–0.4)
EOSINOPHIL NFR BLD: 2 % (ref 0–5)
ERYTHROCYTE [DISTWIDTH] IN BLOOD BY AUTOMATED COUNT: 15.1 % (ref 11.6–14.5)
GLOBULIN SER CALC-MCNC: 3.8 G/DL (ref 2–4)
GLUCOSE SERPL-MCNC: 91 MG/DL (ref 74–99)
HCT VFR BLD AUTO: 42.6 % (ref 35–45)
HGB BLD-MCNC: 14 G/DL (ref 12–16)
LYMPHOCYTES # BLD AUTO: 31 % (ref 21–52)
LYMPHOCYTES # BLD: 2.1 K/UL (ref 0.9–3.6)
MCH RBC QN AUTO: 30.3 PG (ref 24–34)
MCHC RBC AUTO-ENTMCNC: 32.9 G/DL (ref 31–37)
MCV RBC AUTO: 92.2 FL (ref 74–97)
MONOCYTES # BLD: 0.7 K/UL (ref 0.05–1.2)
MONOCYTES NFR BLD AUTO: 10 % (ref 3–10)
NEUTS SEG # BLD: 3.9 K/UL (ref 1.8–8)
NEUTS SEG NFR BLD AUTO: 56 % (ref 40–73)
PLATELET # BLD AUTO: 225 K/UL (ref 135–420)
PMV BLD AUTO: 11.8 FL (ref 9.2–11.8)
POTASSIUM SERPL-SCNC: 4.3 MMOL/L (ref 3.5–5.5)
PROT SERPL-MCNC: 7.2 G/DL (ref 6.4–8.2)
RBC # BLD AUTO: 4.62 M/UL (ref 4.2–5.3)
SODIUM SERPL-SCNC: 140 MMOL/L (ref 136–145)
VALPROATE SERPL-MCNC: 21 UG/ML (ref 50–100)
WBC # BLD AUTO: 6.9 K/UL (ref 4.6–13.2)

## 2017-03-27 PROCEDURE — 85025 COMPLETE CBC W/AUTO DIFF WBC: CPT | Performed by: NURSE PRACTITIONER

## 2017-03-27 PROCEDURE — 80053 COMPREHEN METABOLIC PANEL: CPT | Performed by: NURSE PRACTITIONER

## 2017-03-27 PROCEDURE — 80164 ASSAY DIPROPYLACETIC ACD TOT: CPT | Performed by: NURSE PRACTITIONER

## 2017-03-27 PROCEDURE — 36415 COLL VENOUS BLD VENIPUNCTURE: CPT | Performed by: NURSE PRACTITIONER

## 2017-03-28 NOTE — PROGRESS NOTES
Will review results with pt at 4/4/17 appt  1) Valproic acid from [facility] to 21 L. Taking Depakote ER 500mg 3 tabs at night?   All other labs ok

## 2017-04-04 ENCOUNTER — OFFICE VISIT (OUTPATIENT)
Dept: FAMILY MEDICINE CLINIC | Age: 52
End: 2017-04-04

## 2017-04-04 VITALS
HEART RATE: 95 BPM | TEMPERATURE: 98.9 F | WEIGHT: 101.2 LBS | BODY MASS INDEX: 19.11 KG/M2 | DIASTOLIC BLOOD PRESSURE: 73 MMHG | SYSTOLIC BLOOD PRESSURE: 116 MMHG | RESPIRATION RATE: 20 BRPM | OXYGEN SATURATION: 97 % | HEIGHT: 61 IN

## 2017-04-04 DIAGNOSIS — Z51.81 THERAPEUTIC DRUG MONITORING: ICD-10-CM

## 2017-04-04 DIAGNOSIS — F17.200 CURRENT SMOKER: ICD-10-CM

## 2017-04-04 DIAGNOSIS — F32.A DEPRESSION, UNSPECIFIED DEPRESSION TYPE: ICD-10-CM

## 2017-04-04 DIAGNOSIS — R56.9 SEIZURE (HCC): Primary | Chronic | ICD-10-CM

## 2017-04-04 DIAGNOSIS — F11.10 HEROIN ABUSE (HCC): ICD-10-CM

## 2017-04-04 RX ORDER — DIVALPROEX SODIUM 500 MG/1
TABLET, EXTENDED RELEASE ORAL
Qty: 120 TAB | Refills: 3 | Status: SHIPPED | COMMUNITY
Start: 2017-04-04 | End: 2017-05-08

## 2017-04-04 NOTE — MR AVS SNAPSHOT
Visit Information Date & Time Provider Department Dept. Phone Encounter #  
 4/4/2017  2:30 PM Michelle Thomason NP [address] [facility] [address] [id number] Follow-up Instructions Return in about 3 months (around 7/4/2017). Upcoming Health Maintenance Date Due Pneumococcal 19-64 Medium Risk (1 of 1 - PPSV23) 3/22/1984 FOBT Q 1 YEAR AGE 50-75 7/25/2017 BREAST CANCER SCRN MAMMOGRAM 1/20/2018 PAP AKA CERVICAL CYTOLOGY 6/30/2018 DTaP/Tdap/Td series (2 - Td) 5/27/2026 Allergies as of 4/4/2017  Review Complete On: 4/4/2017 By: Almas Bailey Severity Noted Reaction Type Reactions Ampicillin High 08/13/2012    Anaphylaxis Penicillin G High 08/13/2012    Anaphylaxis Adhesive Tape-silicones  76/57/1007    Rash Tegretol [Carbamazepine]  08/13/2012    Rash Current Immunizations  Reviewed on 3/17/2017 Name Date Influenza Vaccine [place]) 11/16/2016 Influenza Vaccine (Quad) PF 12/1/2015 Influenza Vaccine (Trivalent) 11/6/2014 10:10 AM  
 Influenza Vaccine PF 12/16/2013 Tdap 5/27/2016  1:35 PM, 5/25/2015  8:57 PM  
  
 Not reviewed this visit You Were Diagnosed With   
  
 Codes Comments [facility]     ICD-10-CM: R56.9 ICD-9-CM: 780.39 Vitals BP Pulse Temp Resp Height(growth percentile) Weight(growth percentile) 116/73 95 98.9 °F (37.2 °C) 20 5' 1\" (1.549 m) 101 lb 3.2 oz (45.9 kg) LMP SpO2 BMI OB Status Smoking Status 07/29/2012 97% 19.12 kg/m2 Postmenopausal Current Every Day Smoker Vitals History BMI and BSA Data Body Mass Index Body Surface Area  
 19.12 kg/m 2 1.41 m 2 Preferred Pharmacy Pharmacy Name Phone [facility] [address] [place] [address] [phone] Your Updated Medication List  
  
   
This list is accurate as of: 4/4/17  3:41 PM.  Always use your most recent med list.  
  
  
 * albuterol 90 mcg/actuation inhaler Commonly known as:  VENTOLIN HFA Take 2 Puffs by inhalation every four (4) hours as needed for Wheezing or Shortness of Breath (Cough). Indications: CHRONIC OBSTRUCTIVE PULMONARY DISEASE * albuterol 2.5 mg /3 mL (0.083 %) nebulizer solution Commonly known as:  PROVENTIL VENTOLIN  
3 mL by Nebulization route every four (4) hours as needed for Wheezing or Shortness of Breath for up to 30 days. Indications: Acute Asthma Attack  
  
 budesonide-formoterol 160-4.5 mcg/actuation HFA inhaler Commonly known as:  SYMBICORT Take 2 Puffs by inhalation two (2) times a day. divalproex  mg ER tablet Commonly known as:  DEPAKOTE ER Take 2 tabs in the morning and 2 tabs in the evening for seizures. DULoxetine 60 mg capsule Commonly known as:  CYMBALTA Take 1 Cap by mouth daily. gabapentin 300 mg capsule Commonly known as:  NEURONTIN Take 1 Cap by mouth three (3) times daily for 30 days. Indications: NEUROPATHIC PAIN, Mood instability  
  
 lacosamide 200 mg Tab tablet Commonly known as:  VIMPAT Take 1 Tab by mouth two (2) times a day. Indications: PARTIAL EPILEPSY TREATMENT ADJUNCT  
  
 omega-3 acid ethyl esters 1 gram capsule Commonly known as:  Pattricia Ny Take 2 Caps by mouth daily (with breakfast). Indications: HYPERTRIGLYCERIDEMIA  
  
 zolpidem 10 mg tablet Commonly known as:  AMBIEN Take 1 Tab by mouth nightly for 30 days. Max Daily Amount: 10 mg. Indications: SLEEP-ONSET INSOMNIA * Notice: This list has 2 medication(s) that are the same as other medications prescribed for you. Read the directions carefully, and ask your doctor or other care provider to review them with you. Prescriptions Printed Refills  
 divalproex ER (DEPAKOTE ER) 500 mg ER tablet 3 Sig: Take 2 tabs in the morning and 2 tabs in the evening for seizures. Class: Program  
  
Prescriptions Sent to Mail Order Refills  
 divalproex ER (DEPAKOTE ER) 500 mg ER tablet 3 Sig: Take 2 tabs in the morning and 2 tabs in the evening for seizures. Class: Program  
 Pharmacy: [facility] [address] [place] [address] Jose [address] [place] [address] #: [phone] Prescriptions Sent to Pharmacy Refills  
 divalproex ER (DEPAKOTE ER) 500 mg ER tablet 3 Sig: Take 2 tabs in the morning and 2 tabs in the evening for seizures. Class: Program  
 Pharmacy: [facility] [address] [place] [address] Jose [address] [place] [address] Ph #: [phone] Follow-up Instructions Return in about 3 months (around 7/4/2017). To-Do List   
 05/01/2017 Lab:  [facility]   
  
 08/01/2017 Lab:  [facility] Patient Instructions [facility] reminders! Foundation Operating Hours: These may change without notice. Mon- Wed 7am to 5pm. Closed for lunch 12-1pm 
Thurs 7am to 12pm 
Fridays closed **In case of inclement weather (snow and ice) please do not try to come into the office for your appointment. Please call in and you will not be held as a Luqit. \" SAFETY FIRST!!** 
 
NO SHOW POLICY ~ If a patient has 3 no shows for an appointment with the Provider, Mental Health Provider, or the Nurse Navigator, they will be discharged from the practice for 12 months. Medication ordering will also be suspended. If the patient is discharged from the [facility], they can go to the [place] [address] where they can be seen for the primary needs plus obtain the same types of medications as they receive at the [facility]. To avoid being discharged, the patient must call the office at [phone] 24 hours prior to their appointment if they need to cancel, arrive to their appointments on time and come to all scheduled appointments. If the patient is discharged from the practice, they can apply to be re-established after 12 months. Lab work:  Unless you are instructed differently, please return to the office between the hours of 7 am and 10:30 am Monday through Thursday to have your labs drawn one week before your next scheduled PROVIDER visit. If you do not have an appointment to follow up on these results, please make one or plan to call the office if you do not hear from us to get the results. No news does not mean good news. Medications: If your medications are new or have changed, and you get your medications from the [facility]. [place] [address] [place] [address]), you MUST talk to the pharmacy staff to sign the new prescription applications. If you don't sign the applications we cannot get the medications for you. It usually takes 6-8 weeks for your medications to arrive. The Pharmacy staff will call you when your medications are available. You will have 30 days to come in and  your medications. If you don't  your medicines within those 30 days, those medicines may be placed on the self as samples and you will have to start all over again by completing the applications and waiting the 6-8 weeks for your medicines to arrive. The [facility] or [facility] will assist you with your medications if available but not all medications are available so some, not all medications, may be obtained through local pharmacies such as [facility] that has a large $4 list. We will work hard to get the best medications for you that you can also afford. Foot Care: [facility] through [facility] ([address]) Every second Tuesday of the month (except for holidays and election days) from 9am to 1 pm. The services provided by these ministry volunteers are free of charge with the option to donate. They will inspect your feet thoroughly, soak them for 10 minutes, cut and file your nails. They care for diabetics as well. Keep in mind this service is free and will be on a first come first serve basis. Bad teeth? Ask about the Dental Bus to get you in front of a local dentist. The bus leaves every other Wednesday for those on the list. (Ask about availability as these appointments are limited) DIABETES: Do you have uncontrolled diabetes or you just want to learn more about your diabetes? Schedule with the Nurse navigator for our new 5-week Diabetes program. You will learn how to properly manage your diabetes: nutrition, exercise, medication therapy. Eye exams for Diabetics. Please let us know so we can add you to the list to see the eye doctor at [facility]. These appointments are limited. You will receive a free eye exam and free glasses if needed. Unfortunately, if you are not a diabetic, we do not have a free service for eye exams for you (yet!). We do have information on where to go to get a huge discount on eye exams and glasses. Sick visits: If you are sick and it is not an emergency call the office to see if you can schedule an appointment. Charges and cost items from the Foundation:  Most of our orders are covered by [employer] but there ARE SOME CHARGES for items such as radiology interpretations and anesthesiology during procedures and surgeries. Please make sure you have contacted the Advanced Patient Advocacy (APA) group to check on your payment options: [email or web address]. APA is available Mon - Fri 8-4pm at [facility] on the first floor by the information desk. Their number is [phone]. It is important that you are screened in order to qualify for assistance and to avoid huge medical charges. The [facility] is not responsible for ANY charges you may accrue regardless of who ordered the medication, procedure, treatment or test. If you go to the Emergency Room, you WILL be charged! Behavior and emotional issues!   It is stressful to be sick, have an illness, take medications, not have a job, not have medical insurance, have family issues or just getting older! Schedule an appointment with our mental health provider. She is in the office Mondays and Wednesdays from 8am to [facility] [address] can also contact the following: The national suicide hotline (4-039-771-OUYH or 3-148.811.2883) 15[address] [facility] [address] [facility], [address] [facility] 
[phone] Community Services Board ([employer]B) - [place] [address] [facility], [address] Debra Johnson 
[phone] Drug and Alcohol Addiction Issues! It is hard to stop a poor habit but there is help out there. Please feel free to attend any other the following support groups to help you kick the habit or go to [facility] to be evaluated by the psychiatric team. Never give up!! AlAnon meetings: Karmen pascale [place], Tang rosario [facility] MONDAY 7:30 PM JUST FOR TODAY [employer] Al-Anon [facility] [address] [facility] WEDNESDAY 10:30 AM [place] [employer] Al-Anon Issue [facility]SEM[employer] OF [facility], ROOM 201 [address] [facility]  
 
[facility] WEDNESDAY 8:00  Kelsey Zep[facility] [address] [facility] THURSDAY 8:00 PM KEEP IT SIMPLE AFG Al-Anon [facility] [address] 8:00 PM LET IT BEGIN WITH ME [employer] Al-Anon [facility] [address] [facility] FRIDAY 6;30 PM [place] PARENTS [employer] [place] [facility] [address] [facility] [address] [place] MONDAY 10:30  [place] [address] [facility] SATURDAY 8:00 PM [facility] SATURDAY NIGHT AFG Al-Anon [place] [address] [facility] MONDAY 7:00 PM MONDAY NIGHT AFG Al-Anon [place] [facility] 15[address] [facility] WEDNESDAY 8:00 PM [employer] Al-Anon [facility] [address] SERVICES! [facility] introduces [employer] patient portal. Now you can access parts of your medical record, email your doctor's office, and request medication refills online. 1. In your internet browser, go to [email or web address] 2. Click on the First Time User? Click Here link in the Sign In box. You will see the New Member Sign Up page. 3. Enter your [employer] Access Code exactly as it appears below. You will not need to use this code after youve completed the sign-up process. If you do not sign up before the expiration date, you must request a new code. · [employer] Access Code: [email or web address] Expires: 4/9/2017  9:33 AM 
 
4. Enter the last four digits of your Social Security Number (xxxx) and Date of Birth (mm/dd/yyyy) as indicated and click Submit. You will be taken to the next sign-up page. 5. Create a [employer] ID. This will be your [employer] login ID and cannot be changed, so think of one that is secure and easy to remember. 6. Create a [employer] password. You can change your password at any time. 7. Enter your Password Reset Question and Answer. This can be used at a later time if you forget your password. 8. Enter your e-mail address. You will receive e-mail notification when new information is available in [address] 9. Click Sign Up. You can now view and download portions of your medical record. 10. Click the Download Summary menu link to download a portable copy of your medical information. If you have questions, please visit the Frequently Asked Questions section of the [employer] website. Remember, [employer] is NOT to be used for urgent needs. For medical emergencies, dial 911. Now available from your iPhone and Android! Please provide this summary of care documentation to your next provider. Your primary care clinician is listed as Tiburcio David. If you have any questions after today's visit, please call [phone].
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
## 2017-04-04 NOTE — PATIENT INSTRUCTIONS
[facility] reminders! Foundation Operating Hours: These may change without notice. Mon- Wed 7am to 5pm. Closed for lunch 12-1pm  Thurs 7am to 12pm  Fridays closed     **In case of inclement weather (snow and ice) please do not try to come into the office for your appointment. Please call in and you will not be held as a ViaSat. \" SAFETY FIRST!!**    NO SHOW POLICY ~ If a patient has 3 no shows for an appointment with the Provider, Mental Health Provider, or the Nurse Navigator, they will be discharged from the practice for 12 months. Medication ordering will also be suspended. If the patient is discharged from the [place], they can go to the [place] [address] where they can be seen for the primary needs plus obtain the same types of medications as they receive at the [place]. To avoid being discharged, the patient must call the office at [phone] 24 hours prior to their appointment if they need to cancel, arrive to their appointments on time and come to all scheduled appointments. If the patient is discharged from the [facility], they can apply to be re-established after 12 months. Lab work:  Unless you are instructed differently, please return to the office between the hours of 7 am and 10:30 am Monday through Thursday to have your labs drawn one week before your next scheduled PROVIDER visit. If you do not have an appointment to follow up on these results, please make one or plan to call the office if you do not hear from us to get the results. No news does not mean good news. Medications: If your medications are new or have changed, and you get your medications from the [facility] [address] [place] [address]), you MUST talk to the pharmacy staff to sign the new prescription applications. If you don't sign the applications we cannot get the medications for you. It usually takes 6-8 weeks for your medications to arrive. The Pharmacy staff will call you when your medications are available.  You will have 30 days to come in and  your medications. If you don't  your medicines within those 30 days, those medicines may be placed on the self as samples and you will have to start all over again by completing the applications and waiting the 6-8 weeks for your medicines to arrive. The [facility] or [facility] will assist you with your medications if available but not all medications are available so some, not all medications, may be obtained through local pharmacies such as [facility] that has a large $4 list. We will work hard to get the best medications for you that you can also afford. Foot Care: Local [facility] through [facility] ([address] [facility])  Every second Tuesday of the month (except for holidays and election days) from 9am to 1 pm. The services provided by these ministry volunteers are free of charge with the option to donate. They will inspect your feet thoroughly, soak them for 10 minutes, cut and file your nails. They care for diabetics as well. Keep in mind this service is free and will be on a first come first serve basis. Bad teeth? Ask about the Dental Bus to get you in front of a local dentist. The bus leaves every other Wednesday for those on the list. (Ask about availability as these appointments are limited)    DIABETES: Do you have uncontrolled diabetes or you just want to learn more about your diabetes? Schedule with the Nurse navigator for our new 5-week Diabetes program. You will learn how to properly manage your diabetes: nutrition, exercise, medication therapy. Eye exams for Diabetics. Please let us know so we can add you to the list to see the eye doctor at [facility]. These appointments are limited. You will receive a free eye exam and free glasses if needed. Unfortunately, if you are not a diabetic, we do not have a free service for eye exams for you (yet!). We do have information on where to go to get a huge discount on eye exams and glasses.     Sole Muñiz visits: If you are sick and it is not an emergency call the office to see if you can schedule an appointment. Charges and cost items from the Foundation:  Most of our orders are covered by [employer] but there ARE SOME CHARGES for items such as radiology interpretations and anesthesiology during procedures and surgeries. Please make sure you have contacted the Advanced Patient Advocacy (APA) group to check on your payment options: [email or web address]. APA is available Mon - Fri 8-4pm at [facility] on the first floor by the information desk. Their number is [phone]. It is important that you are screened in order to qualify for assistance and to avoid huge medical charges. The [facility] is not responsible for ANY charges you may accrue regardless of who ordered the medication, procedure, treatment or test. If you go to the Emergency Room, you WILL be charged! Behavior and emotional issues! It is stressful to be sick, have an illness, take medications, not have a job, not have medical insurance, have family issues or just getting older! Schedule an appointment with our mental health provider. She is in the office Mondays and Wednesdays from 8am to [address] [facility] can also contact the following: The national suicide hotline (7-847-898-CGCA or 6-738.361.2604)    [address], [address] [place] [address] [address] [place] [address]   [phone]    Drug and Alcohol Addiction Issues! It is hard to stop a poor habit but there is help out there. Please feel free to attend any other the following support groups to help you kick the habit or go to [facility] Emergency Department to be evaluated by the psychiatric team. Never give up!!     [place] meetings: [facility], [address] [place] [address],[address] Floor 7:30 PM JUST FOR TODAY YULIANA Sharp [address]     [place]ES[place] WEDNESDAY 10:30 AM [employer] Al-Anon [employer] [facility], ROOM [address] [place] [address]     [facility] WEDNES[facility] 8:00 PM GREENLILY [employer] Al-Anon [address] [facility] 8:00 PM KEEP IT SIMPLE AFG Al-Anon [facility] [address]     [facility] THURSD[facility] 8:00 PM LET IT BEGIN WITH ME AFG Al-Anon [facility] [address] [facility] FRIDAY 6;30 PM CHESAPEAKE PARENTS [employer] [facility] [address]      [place] MONDAY 10:30 AM LIFELINE [employer] Al-Anon [facility] [address] [facility] SATURDAY 8:00 PM [facility] SATURDAY NIGHT AFG Al-Anon [facility] [address] [facility] MONDAY 7:00 PM MONDAY NIGHT AFG Al-Anon [facility] [address]     [place] WEDNESDAY 8:00 PM SERENITY SEEKERS [employer] Al-Anon [facility] [address] [facility]

## 2017-04-05 ENCOUNTER — TELEPHONE (OUTPATIENT)
Dept: FAMILY MEDICINE CLINIC | Age: 52
End: 2017-04-05

## 2017-04-05 NOTE — TELEPHONE ENCOUNTER
Leeanna pt was told yesterday that she needs to go buy the machine and then let me know that she has done that. Then I will e-scribe solution for her. If she dont have the machine then the medicine will not work for her.    Thank you

## 2017-04-05 NOTE — TELEPHONE ENCOUNTER
Patient calling stating that her Albuterol Nebulizer wasn't called in to pharmacy. Can you please refill this rx.

## 2017-04-05 NOTE — TELEPHONE ENCOUNTER
Pt is aware to get machine and will call us once she gets the machine. After she calls us her provider will escribed her rx into pharmacy.

## 2017-04-13 ENCOUNTER — TELEPHONE (OUTPATIENT)
Dept: FAMILY MEDICINE CLINIC | Age: 52
End: 2017-04-13

## 2017-04-13 NOTE — TELEPHONE ENCOUNTER
Medication: Cymbalta 60mg, dose: 1 tab, how often: daily, current number of medication days provided: 90, refill per application. Lot# [id number], EXP 11/2017 . This medication was received and verified for the following 1. Correct Patient, 2. Correct Diagnosis, 3. Correct Drug, 4. Correct route, and no current allergy to medication. Please contact patient to come  their medications.      Kat Rodriguez, MSN,RN,[facility]

## 2017-04-14 ENCOUNTER — HOSPITAL ENCOUNTER (EMERGENCY)
Age: 52
Discharge: HOME | End: 2017-04-14
Attending: EMERGENCY MEDICINE
Payer: MEDICAID

## 2017-04-14 VITALS
BODY MASS INDEX: 19.83 KG/M2 | WEIGHT: 105 LBS | RESPIRATION RATE: 16 BRPM | HEART RATE: 95 BPM | HEIGHT: 61 IN | DIASTOLIC BLOOD PRESSURE: 73 MMHG | OXYGEN SATURATION: 96 % | SYSTOLIC BLOOD PRESSURE: 111 MMHG | TEMPERATURE: 98.8 F

## 2017-04-14 DIAGNOSIS — L02.414 ABSCESS OF LEFT ARM: Primary | ICD-10-CM

## 2017-04-14 DIAGNOSIS — L03.114 CELLULITIS OF LEFT UPPER ARM: ICD-10-CM

## 2017-04-14 DIAGNOSIS — F17.200 CURRENT SMOKER: ICD-10-CM

## 2017-04-14 PROCEDURE — 90715 TDAP VACCINE 7 YRS/> IM: CPT | Performed by: PHYSICIAN ASSISTANT

## 2017-04-14 PROCEDURE — 77030019895 HC PCKNG STRP IODO -A

## 2017-04-14 PROCEDURE — 74011250636 HC RX REV CODE- 250/636: Performed by: PHYSICIAN ASSISTANT

## 2017-04-14 PROCEDURE — 87070 CULTURE OTHR SPECIMN AEROBIC: CPT | Performed by: EMERGENCY MEDICINE

## 2017-04-14 PROCEDURE — 87077 CULTURE AEROBIC IDENTIFY: CPT | Performed by: EMERGENCY MEDICINE

## 2017-04-14 PROCEDURE — 99282 EMERGENCY DEPT VISIT SF MDM: CPT

## 2017-04-14 PROCEDURE — 87186 SC STD MICRODIL/AGAR DIL: CPT | Performed by: EMERGENCY MEDICINE

## 2017-04-14 PROCEDURE — 75810000289 HC I&D ABSCESS SIMP/COMP/MULT

## 2017-04-14 PROCEDURE — 90471 IMMUNIZATION ADMIN: CPT

## 2017-04-14 RX ORDER — SULFAMETHOXAZOLE AND TRIMETHOPRIM 800; 160 MG/1; MG/1
2 TABLET ORAL 2 TIMES DAILY
Qty: 40 TAB | Refills: 0 | Status: SHIPPED | OUTPATIENT
Start: 2017-04-14 | End: 2017-04-24

## 2017-04-14 RX ORDER — IBUPROFEN 800 MG/1
800 TABLET ORAL EVERY 8 HOURS
Qty: 15 TAB | Refills: 0 | Status: SHIPPED | OUTPATIENT
Start: 2017-04-14 | End: 2017-04-19

## 2017-04-14 RX ADMIN — TETANUS TOXOID, REDUCED DIPHTHERIA TOXOID AND ACELLULAR PERTUSSIS VACCINE, ADSORBED 0.5 ML: 5; 2.5; 8; 8; 2.5 SUSPENSION INTRAMUSCULAR at 16:40

## 2017-04-14 NOTE — ED PROVIDER NOTES
Patient is a 46 y.o. female presenting with abscess. The history is provided by the patient. Abscess   Pertinent negatives include no chest pain and no shortness of breath. Pt is c/o left distal upper arm abscess pain x >1 week. Pt denies IM or IV heroin. Denies h/o MRSA. Smokes; denies ETOH. Past Medical History:   Diagnosis Date    Chronic obstructive pulmonary disease ([facility] 2016    severe. PFT confirmed. 2016    COPD     Cord compression [facility])     Dr Elana Ovalle Current smoker 2016    Depression     Hepatitis C antibody test positive 2015    Negative viral load, Immune    History of cocaine abuse     History of heroin abuse     Hypercholesterolemia     Other diseases of lung, not elsewhere classified     pneumonia    [facility]     complex partial, unk etiology; Dr. Olson Pastures. Oletha Salvage, Neurology-    Spinal stenosis     Thyroid disease     many years per pt    Tobacco use        Past Surgical History:   Procedure Laterality Date    HX CERVICAL DISKECTOMY      HX  SECTION      HX GYN            HX TUBAL LIGATION           Family History:   Problem Relation Age of Onset    Cancer Father 48     lung    Lung Disease Father     Cancer Maternal Grandmother      lung    Lung Disease Maternal Grandmother        Social History     Social History    Marital status: LEGALLY      Spouse name: N/A    Number of children: 2    Years of education: 8     Occupational History    Not on file. Social History Main Topics    Smoking status: Current Every Day Smoker     Packs/day: 0.25     Years: 35.00     Types: Cigarettes    Smokeless tobacco: Never Used    Alcohol use No    Drug use: 7.00 per week     Special: Heroin, Marijuana      Comment:  states it has been awhile since she did Heroin.     Sexual activity: Yes     Partners: Male     Birth control/ protection: Condom     Other Topics Concern     Service No    Blood Transfusions No  Caffeine Concern No    Occupational Exposure No    Hobby Hazards No    Sleep Concern No    Stress Concern No    Weight Concern No    Special Diet No    Back Care No    Exercise No    Bike Helmet No    Seat Belt Yes    Self-Exams No     Social History Narrative         ALLERGIES: Ampicillin; Penicillin g; Adhesive tape-silicones; and Tegretol [carbamazepine]    Review of Systems   Constitutional: Negative for fever. Respiratory: Negative for shortness of breath. Cardiovascular: Negative for chest pain. Skin: Positive for rash. Neurological: Negative for weakness and numbness. All other systems reviewed and are negative. Vitals:    04/14/17 1532   BP: 111/73   Pulse: 95   Resp: 16   Temp: 98.8 °F (37.1 °C)   SpO2: 96%   Weight: 47.6 kg (105 lb)   Height: 5' 1\" (1.549 m)            Physical Exam   Constitutional: She is oriented to person, place, and time. She appears well-developed. HENT:   Head: Normocephalic and atraumatic. Eyes: Pupils are equal, round, and reactive to light. Neck: No JVD present. No tracheal deviation present. No thyromegaly present. Cardiovascular: Normal rate, regular rhythm and normal heart sounds. Exam reveals no gallop and no friction rub. No murmur heard. Pulmonary/Chest: Effort normal and breath sounds normal. No stridor. No respiratory distress. She has no wheezes. She has no rales. She exhibits no tenderness. Abdominal: Soft. She exhibits no distension and no mass. There is no tenderness. There is no rebound and no guarding. Musculoskeletal: She exhibits no edema or tenderness. Lymphadenopathy:     She has no cervical adenopathy. Neurological: She is alert and oriented to person, place, and time. Skin: Skin is warm and dry. Rash noted. No erythema. No pallor. Just superior to pt's left AC, there is a very large, fluctuant abscess w/halo of erythema at least 7cm in diameter. No streaking.    Psychiatric: She has a normal mood and affect. Her behavior is normal. Thought content normal.   Nursing note and vitals reviewed. MDM  Number of Diagnoses or Management Options  Abscess of left arm:   Cellulitis of left upper arm:   Current smoker:   Diagnosis management comments: Differential: abscess; cellulitis; compartment syndrome; nec fasc; MRSA    Uses heroin but denies using this site (snorts per SO). Wound culture sent. Area circumscribed w/skin pen. F/up with PCP in 3d. Amount and/or Complexity of Data Reviewed  Clinical lab tests: ordered      ED Course       I&D Abcess Complex  Date/Time: 4/14/2017 4:10 PM  Performed by: Mariah Kellogg  Authorized by: Mariah Kellogg     Consent:     Consent obtained:  Verbal    Consent given by:  Patient    Risks discussed:  Bleeding, incomplete drainage and infection    Alternatives discussed:  No treatment  Location:     Type:  Abscess    Location:  Upper extremity    Upper extremity location:  Arm    Arm location:  L upper arm  Pre-procedure details:     Skin preparation:  Betadine  Anesthesia (see MAR for exact dosages): Anesthesia method:  Local infiltration    Local anesthetic:  Lidocaine 1% w/o epi  Procedure type:     Complexity:  Complex  Procedure details:     Needle aspiration: no      Incision types:  Single with marsupialization    Incision depth:  Dermal    Scalpel blade:  11    Wound management:  Probed and deloculated    Drainage:  Purulent    Drainage amount:  Copious    Wound treatment:  Drain placed    Packing materials:  1/2 in iodoform gauze    Amount 1/2\" iodoform:  3.5  Post-procedure details:     Patient tolerance of procedure: Tolerated well, no immediate complications          2:32 PM  Diagnosis:   1. Abscess of left arm    2. Current smoker    3.  Cellulitis of left upper arm          Disposition: home    Follow-up Information     Follow up With Details Comments [address], NP Schedule an appointment as soon as possible for a visit in 3 days For wound re-check [address]  [phone]      [facility] EMERGENCY DEPT  If symptoms worsen return immediately [address] [address]  [phone]          Patient's Medications   Start Taking    IBUPROFEN (MOTRIN) 800 MG TABLET    Take 1 Tab by mouth every eight (8) hours for 5 days. TRIMETHOPRIM-SULFAMETHOXAZOLE (BACTRIM DS) 160-800 MG PER TABLET    Take 2 Tabs by mouth two (2) times a day for 10 days. Continue Taking    ALBUTEROL (PROVENTIL VENTOLIN) 2.5 MG /3 ML (0.083 %) NEBULIZER SOLUTION    3 mL by Nebulization route every four (4) hours as needed for Wheezing or Shortness of Breath for up to 30 days. Indications: Acute Asthma Attack    ALBUTEROL (VENTOLIN HFA) 90 MCG/ACTUATION INHALER    Take 2 Puffs by inhalation every four (4) hours as needed for Wheezing or Shortness of Breath (Cough). Indications: CHRONIC OBSTRUCTIVE PULMONARY DISEASE    BUDESONIDE-FORMOTEROL (SYMBICORT) 160-4.5 MCG/ACTUATION HFA INHALER    Take 2 Puffs by inhalation two (2) times a day. DIVALPROEX ER (DEPAKOTE ER) 500 MG ER TABLET    Take 2 tabs in the morning and 2 tabs in the evening for seizures. DULOXETINE (CYMBALTA) 60 MG CAPSULE    Take 1 Cap by mouth daily. GABAPENTIN (NEURONTIN) 300 MG CAPSULE    Take 1 Cap by mouth three (3) times daily for 30 days. Indications: NEUROPATHIC PAIN, Mood instability    LACOSAMIDE (VIMPAT) 200 MG TAB TABLET    Take 1 Tab by mouth two (2) times a day. Indications: PARTIAL EPILEPSY TREATMENT ADJUNCT    OMEGA-3 ACID ETHYL ESTERS (LOVAZA) 1 GRAM CAPSULE    Take 2 Caps by mouth daily (with breakfast). Indications: HYPERTRIGLYCERIDEMIA    ZOLPIDEM (AMBIEN) 10 MG TABLET    Take 1 Tab by mouth nightly for 30 days. Max Daily Amount: 10 mg.  Indications: SLEEP-ONSET INSOMNIA   These Medications have changed    No medications on file   Stop Taking    No medications on file

## 2017-04-14 NOTE — DISCHARGE INSTRUCTIONS
Skin Abscess: Care Instructions  Your Care Instructions    A skin abscess is a bacterial infection that forms a pocket of pus. A boil is a kind of skin abscess. The doctor may have cut an opening in the abscess so that the pus can drain out. You may have gauze in the cut so that the abscess will stay open and keep draining. You may need antibiotics. You will need to follow up with your doctor to make sure the infection has gone away. The doctor has checked you carefully, but problems can develop later. If you notice any problems or new symptoms, get medical treatment right away. Follow-up care is a key part of your treatment and safety. Be sure to make and go to all appointments, and call your doctor if you are having problems. It's also a good idea to know your test results and keep a list of the medicines you take. How can you care for yourself at home? · Apply warm and dry compresses, a heating pad set on low, or a hot water bottle 3 or 4 times a day for pain. Keep a cloth between the heat source and your skin. · If your doctor prescribed antibiotics, take them as directed. Do not stop taking them just because you feel better. You need to take the full course of antibiotics. · Take pain medicines exactly as directed. ¨ If the doctor gave you a prescription medicine for pain, take it as prescribed. ¨ If you are not taking a prescription pain medicine, ask your doctor if you can take an over-the-counter medicine. · Keep your bandage clean and dry. Change the bandage whenever it gets wet or dirty, or at least one time a day. · If the abscess was packed with gauze:  ¨ Keep follow-up appointments to have the gauze changed or removed. If the doctor instructed you to remove the gauze, gently pull out all of the gauze when your doctor tells you to. ¨ After the gauze is removed, soak the area in warm water for 15 to 20 minutes 2 times a day, until the wound closes. When should you call for help?   Call your doctor now or seek immediate medical care if:  · You have signs of worsening infection, such as:  ¨ Increased pain, swelling, warmth, or redness. ¨ Red streaks leading from the infected skin. ¨ Pus draining from the wound. ¨ A fever. Watch closely for changes in your health, and be sure to contact your doctor if:  · You do not get better as expected. Where can you learn more? Go to http://emigdio-mikey.info/. Enter C530 in the search box to learn more about \"Skin Abscess: Care Instructions. \"  Current as of: October 13, 2016  Content Version: 11.2  © 8105-5919 [employer]. Care instructions adapted under license by [employer] (which disclaims liability or warranty for this information). If you have questions about a medical condition or this instruction, always ask your healthcare professional. [place] [address] any warranty or liability for your use of this information. Cellulitis: Care Instructions  Your Care Instructions    Cellulitis is a skin infection. It often occurs after a break in the skin from a scrape, cut, bite, or puncture, or after a rash. The doctor has checked you carefully, but problems can develop later. If you notice any problems or new symptoms, get medical treatment right away. Follow-up care is a key part of your treatment and safety. Be sure to make and go to all appointments, and call your doctor if you are having problems. It's also a good idea to know your test results and keep a list of the medicines you take. How can you care for yourself at home? · Take your antibiotics as directed. Do not stop taking them just because you feel better. You need to take the full course of antibiotics. · Prop up the infected area on pillows to reduce pain and swelling. Try to keep the area above the level of your heart as often as you can.   · If your doctor told you how to care for your wound, follow your doctor's instructions. If you did not get instructions, follow this general advice:  ¨ Wash the wound with clean water 2 times a day. Don't use hydrogen peroxide or alcohol, which can slow healing. ¨ You may cover the wound with a thin layer of petroleum jelly, such as Vaseline, and a nonstick bandage. ¨ Apply more petroleum jelly and replace the bandage as needed. · Be safe with medicines. Take pain medicines exactly as directed. ¨ If the doctor gave you a prescription medicine for pain, take it as prescribed. ¨ If you are not taking a prescription pain medicine, ask your doctor if you can take an over-the-counter medicine. To prevent cellulitis in the future  · Try to prevent cuts, scrapes, or other injuries to your skin. Cellulitis most often occurs where there is a break in the skin. · If you get a scrape, cut, mild burn, or bite, wash the wound with clean water as soon as you can to help avoid infection. Don't use hydrogen peroxide or alcohol, which can slow healing. · If you have swelling in your legs (edema), support stockings and good skin care may help prevent leg sores and cellulitis. · Take care of your feet, especially if you have diabetes or other conditions that increase the risk of infection. Wear shoes and socks. Do not go barefoot. If you have athlete's foot or other skin problems on your feet, talk to your doctor about how to treat them. When should you call for help? Call your doctor now or seek immediate medical care if:  · You have signs that your infection is getting worse, such as:  ¨ Increased pain, swelling, warmth, or redness. ¨ Red streaks leading from the area. ¨ Pus draining from the area. ¨ A fever. · You get a rash. Watch closely for changes in your health, and be sure to contact your doctor if:  · You are not getting better after 1 day (24 hours). · You do not get better as expected. Where can you learn more? Go to http://jessica.info/.   Vipul Hutchins in the search box to learn more about \"Cellulitis: Care Instructions. \"  Current as of: October 13, 2016  Content Version: 11.2  © 2021-3579 [employer]. Care instructions adapted under license by [employer] (which disclaims liability or warranty for this information). If you have questions about a medical condition or this instruction, always ask your healthcare professional. [place] [address] any warranty or liability for your use of this information.

## 2017-04-14 NOTE — ED NOTES
I have reviewed discharge instructions with the patient. The patient verbalized understanding. The patent was discharged with two prescriptions.

## 2017-04-16 LAB
BACTERIA SPEC CULT: ABNORMAL
GRAM STN SPEC: ABNORMAL
SERVICE CMNT-IMP: ABNORMAL

## 2017-05-03 ENCOUNTER — HOSPITAL ENCOUNTER (OUTPATIENT)
Dept: LAB | Age: 52
Discharge: HOME | End: 2017-05-03

## 2017-05-03 ENCOUNTER — LAB ONLY (OUTPATIENT)
Dept: FAMILY MEDICINE CLINIC | Age: 52
End: 2017-05-03

## 2017-05-03 DIAGNOSIS — R56.9 SEIZURE (HCC): Primary | Chronic | ICD-10-CM

## 2017-05-03 DIAGNOSIS — R56.9 SEIZURE (HCC): Chronic | ICD-10-CM

## 2017-05-03 LAB — VALPROATE SERPL-MCNC: 43 UG/ML (ref 50–100)

## 2017-05-03 PROCEDURE — 80164 ASSAY DIPROPYLACETIC ACD TOT: CPT | Performed by: NURSE PRACTITIONER

## 2017-05-03 NOTE — PROGRESS NOTES
Labs drawn on second attempt in left thumb. Three identifiers used for confirmation: name,  and last of SSN. Lab orders verified.

## 2017-05-04 ENCOUNTER — HOSPITAL ENCOUNTER (INPATIENT)
Age: 52
LOS: 3 days | Discharge: HOME | DRG: 773 | End: 2017-05-08
Attending: EMERGENCY MEDICINE | Admitting: PSYCHIATRY & NEUROLOGY
Payer: COMMERCIAL

## 2017-05-04 DIAGNOSIS — E78.1 HYPERTRIGLYCERIDEMIA: ICD-10-CM

## 2017-05-04 DIAGNOSIS — R45.851 SUICIDAL IDEATION: Primary | ICD-10-CM

## 2017-05-04 DIAGNOSIS — R56.9 SEIZURE (HCC): Primary | Chronic | ICD-10-CM

## 2017-05-04 DIAGNOSIS — F11.10 HEROIN ABUSE (HCC): ICD-10-CM

## 2017-05-04 DIAGNOSIS — J44.9 CHRONIC OBSTRUCTIVE PULMONARY DISEASE, UNSPECIFIED COPD TYPE (HCC): ICD-10-CM

## 2017-05-04 LAB
ANION GAP BLD CALC-SCNC: 8 MMOL/L (ref 3–18)
BASOPHILS # BLD AUTO: 0 K/UL (ref 0–0.06)
BASOPHILS # BLD: 0 % (ref 0–2)
BUN SERPL-MCNC: 18 MG/DL (ref 7–18)
BUN/CREAT SERPL: 21 (ref 12–20)
CALCIUM SERPL-MCNC: 9.7 MG/DL (ref 8.5–10.1)
CHLORIDE SERPL-SCNC: 102 MMOL/L (ref 100–108)
CO2 SERPL-SCNC: 31 MMOL/L (ref 21–32)
CREAT SERPL-MCNC: 0.87 MG/DL (ref 0.6–1.3)
DIFFERENTIAL METHOD BLD: ABNORMAL
EOSINOPHIL # BLD: 0.1 K/UL (ref 0–0.4)
EOSINOPHIL NFR BLD: 2 % (ref 0–5)
ERYTHROCYTE [DISTWIDTH] IN BLOOD BY AUTOMATED COUNT: 15.4 % (ref 11.6–14.5)
ETHANOL SERPL-MCNC: <3 MG/DL (ref 0–3)
GLUCOSE BLD STRIP.AUTO-MCNC: 209 MG/DL (ref 70–110)
GLUCOSE SERPL-MCNC: 294 MG/DL (ref 74–99)
HCT VFR BLD AUTO: 37 % (ref 35–45)
HGB BLD-MCNC: 12.3 G/DL (ref 12–16)
LYMPHOCYTES # BLD AUTO: 25 % (ref 21–52)
LYMPHOCYTES # BLD: 2 K/UL (ref 0.9–3.6)
MCH RBC QN AUTO: 30.7 PG (ref 24–34)
MCHC RBC AUTO-ENTMCNC: 33.2 G/DL (ref 31–37)
MCV RBC AUTO: 92.3 FL (ref 74–97)
MONOCYTES # BLD: 0.4 K/UL (ref 0.05–1.2)
MONOCYTES NFR BLD AUTO: 5 % (ref 3–10)
NEUTS SEG # BLD: 5.4 K/UL (ref 1.8–8)
NEUTS SEG NFR BLD AUTO: 68 % (ref 40–73)
PLATELET # BLD AUTO: 182 K/UL (ref 135–420)
PMV BLD AUTO: 11 FL (ref 9.2–11.8)
POTASSIUM SERPL-SCNC: 3.9 MMOL/L (ref 3.5–5.5)
RBC # BLD AUTO: 4.01 M/UL (ref 4.2–5.3)
SODIUM SERPL-SCNC: 141 MMOL/L (ref 136–145)
WBC # BLD AUTO: 7.9 K/UL (ref 4.6–13.2)

## 2017-05-04 PROCEDURE — 99285 EMERGENCY DEPT VISIT HI MDM: CPT

## 2017-05-04 PROCEDURE — 85025 COMPLETE CBC W/AUTO DIFF WBC: CPT | Performed by: PHYSICIAN ASSISTANT

## 2017-05-04 PROCEDURE — HZ2ZZZZ DETOXIFICATION SERVICES FOR SUBSTANCE ABUSE TREATMENT: ICD-10-PCS | Performed by: PSYCHIATRY & NEUROLOGY

## 2017-05-04 PROCEDURE — 80048 BASIC METABOLIC PNL TOTAL CA: CPT | Performed by: PHYSICIAN ASSISTANT

## 2017-05-04 PROCEDURE — 80307 DRUG TEST PRSMV CHEM ANLYZR: CPT | Performed by: PHYSICIAN ASSISTANT

## 2017-05-04 PROCEDURE — 82962 GLUCOSE BLOOD TEST: CPT

## 2017-05-04 PROCEDURE — 74011250637 HC RX REV CODE- 250/637: Performed by: EMERGENCY MEDICINE

## 2017-05-04 PROCEDURE — 74011000250 HC RX REV CODE- 250: Performed by: EMERGENCY MEDICINE

## 2017-05-04 PROCEDURE — 74011636637 HC RX REV CODE- 636/637: Performed by: EMERGENCY MEDICINE

## 2017-05-04 RX ORDER — HYDROXYZINE PAMOATE 25 MG/1
25 CAPSULE ORAL
Status: DISCONTINUED | OUTPATIENT
Start: 2017-05-04 | End: 2017-05-08 | Stop reason: HOSPADM

## 2017-05-04 RX ORDER — ALBUTEROL SULFATE 0.83 MG/ML
2.5 SOLUTION RESPIRATORY (INHALATION)
Status: DISCONTINUED | OUTPATIENT
Start: 2017-05-04 | End: 2017-05-07 | Stop reason: SDUPTHER

## 2017-05-04 RX ORDER — IPRATROPIUM BROMIDE AND ALBUTEROL SULFATE 2.5; .5 MG/3ML; MG/3ML
3 SOLUTION RESPIRATORY (INHALATION)
Status: COMPLETED | OUTPATIENT
Start: 2017-05-04 | End: 2017-05-04

## 2017-05-04 RX ORDER — CLONIDINE HYDROCHLORIDE 0.1 MG/1
0.1 TABLET ORAL
Status: COMPLETED | OUTPATIENT
Start: 2017-05-04 | End: 2017-05-04

## 2017-05-04 RX ORDER — PREDNISONE 20 MG/1
60 TABLET ORAL
Status: COMPLETED | OUTPATIENT
Start: 2017-05-04 | End: 2017-05-04

## 2017-05-04 RX ORDER — ONDANSETRON 4 MG/1
4 TABLET, FILM COATED ORAL
Status: DISCONTINUED | OUTPATIENT
Start: 2017-05-04 | End: 2017-05-05 | Stop reason: SDUPTHER

## 2017-05-04 RX ADMIN — IPRATROPIUM BROMIDE AND ALBUTEROL SULFATE 3 ML: .5; 3 SOLUTION RESPIRATORY (INHALATION) at 21:37

## 2017-05-04 RX ADMIN — HYDROXYZINE PAMOATE 25 MG: 25 CAPSULE ORAL at 21:34

## 2017-05-04 RX ADMIN — PREDNISONE 60 MG: 20 TABLET ORAL at 21:33

## 2017-05-04 RX ADMIN — CLONIDINE HYDROCHLORIDE 0.1 MG: 0.1 TABLET ORAL at 21:37

## 2017-05-04 NOTE — PROGRESS NOTES
Stephanie,  Please call pt and make her aware that her level came up but is still slightly low. Verify with pt that she is taking Depakote 500mg 2 tabs twice a day.  Also pt needs to schedule a follow up appt with Dr Paloma Tai, neurologist.

## 2017-05-04 NOTE — ED NOTES
I performed a brief evaluation, including history and physical, of the patient here in triage and I have determined that pt will need further treatment and evaluation from the main side ER physician. I have placed initial orders to help in expediting patients care.      May 04, 2017 at 5:24 PM - JESS Corrales        Visit Vitals    /84 (BP 1 Location: Left arm, BP Patient Position: At rest)    Pulse (!) 106    Temp 98.5 °F (36.9 °C)    Resp 16    Ht 5' 1\" (1.549 m)    Wt 49.9 kg (110 lb)    SpO2 99%    BMI 20.78 kg/m2

## 2017-05-04 NOTE — IP AVS SNAPSHOT
Current Discharge Medication List  
  
START taking these medications Dose & Instructions Dispensing Information Comments Morning Noon Evening Bedtime  
 gabapentin 300 mg capsule Commonly known as:  NEURONTIN Your last dose was: Your next dose is:    
   
   
 Dose:  300 mg Take 1 Cap by mouth three (3) times daily for 30 days. Indications: NEUROPATHIC PAIN, mood stabilizer Quantity:  90 Cap Refills:  0  
     
   
   
   
  
 zolpidem 10 mg tablet Commonly known as:  AMBIEN Your last dose was: Your next dose is:    
   
   
 Dose:  10 mg Take 1 Tab by mouth nightly for 30 days. Max Daily Amount: 10 mg. Indications: SLEEP-ONSET INSOMNIA Quantity:  30 Tab Refills:  0 CONTINUE these medications which have CHANGED Dose & Instructions Dispensing Information Comments Morning Noon Evening Bedtime * albuterol 90 mcg/actuation inhaler Commonly known as:  VENTOLIN HFA What changed:  Another medication with the same name was added. Make sure you understand how and when to take each. Your last dose was: Your next dose is:    
   
   
 Dose:  2 Puff Take 2 Puffs by inhalation every four (4) hours as needed for Wheezing or Shortness of Breath (Cough). Indications: CHRONIC OBSTRUCTIVE PULMONARY DISEASE Quantity:  3 Inhaler Refills:  3  
     
   
   
   
  
 * albuterol 90 mcg/actuation inhaler Commonly known as:  PROVENTIL HFA, VENTOLIN HFA, PROAIR HFA What changed: You were already taking a medication with the same name, and this prescription was added. Make sure you understand how and when to take each. Your last dose was: Your next dose is:    
   
   
 Dose:  2 Puff Take 2 Puffs by inhalation every four (4) hours as needed for Wheezing or Shortness of Breath for up to 30 days. Indications: Acute Asthma Attack Quantity:  1 Inhaler Refills:  0 * budesonide-formoterol 160-4.5 mcg/actuation HFA inhaler Commonly known as:  SYMBICORT What changed:  Another medication with the same name was added. Make sure you understand how and when to take each. Your last dose was: Your next dose is:    
   
   
 Dose:  2 Puff Take 2 Puffs by inhalation two (2) times a day. Quantity:  3 Inhaler Refills:  3  
     
   
   
   
  
 * budesonide-formoterol 160-4.5 mcg/actuation HFA inhaler Commonly known as:  SYMBICORT What changed: You were already taking a medication with the same name, and this prescription was added. Make sure you understand how and when to take each. Your last dose was: Your next dose is:    
   
   
 Dose:  2 Puff Take 2 Puffs by inhalation two (2) times a day. Indications: BRONCHOSPASM PREVENTION WITH COPD Quantity:  1 Inhaler Refills:  0  
     
   
   
   
  
 divalproex  mg ER tablet Commonly known as:  DEPAKOTE ER What changed:   
- medication strength 
- how much to take 
- how to take this - when to take this 
- additional instructions Your last dose was: Your next dose is:    
   
   
 Dose:  1250 mg Take 5 Tabs by mouth nightly for 30 days. Indications: RAPID CYCLING BIPOLAR AFFECTIVE DISORDER, Mood stabilizer. Quantity:  150 Tab Refills:  0  
     
   
   
   
  
 * DULoxetine 60 mg capsule Commonly known as:  CYMBALTA What changed:  Another medication with the same name was added. Make sure you understand how and when to take each. Your last dose was: Your next dose is:    
   
   
 Dose:  60 mg Take 1 Cap by mouth daily. Quantity:  90 Cap Refills:  3  
     
   
   
   
  
 * DULoxetine 60 mg capsule Commonly known as:  CYMBALTA What changed: You were already taking a medication with the same name, and this prescription was added. Make sure you understand how and when to take each. Your last dose was: Your next dose is:    
   
   
 Dose:  60 mg Take 1 Cap by mouth daily for 30 days. Indications: ANXIETY WITH DEPRESSION, major depressive disorder Quantity:  30 Cap Refills:  0  
     
   
   
   
  
 * lacosamide 200 mg Tab tablet Commonly known as:  VIMPAT What changed:  Another medication with the same name was added. Make sure you understand how and when to take each. Your last dose was: Your next dose is:    
   
   
 Dose:  200 mg Take 1 Tab by mouth two (2) times a day. Indications: PARTIAL EPILEPSY TREATMENT ADJUNCT Quantity:  180 Tab Refills:  3  
     
   
   
   
  
 * lacosamide 200 mg Tab tablet Commonly known as:  VIMPAT What changed: You were already taking a medication with the same name, and this prescription was added. Make sure you understand how and when to take each. Your last dose was: Your next dose is:    
   
   
 Dose:  200 mg Take 1 Tab by mouth two (2) times a day for 30 days. Max Daily Amount: 400 mg. Indications: FOCAL EPILEPSY Quantity:  60 Tab Refills:  0  
     
   
   
   
  
 * Notice: This list has 8 medication(s) that are the same as other medications prescribed for you. Read the directions carefully, and ask your doctor or other care provider to review them with you. CONTINUE these medications which have NOT CHANGED Dose & Instructions Dispensing Information Comments Morning Noon Evening Bedtime  
 omega-3 acid ethyl esters 1 gram capsule Commonly known as:  Remus Albe Your last dose was: Your next dose is:    
   
   
 Dose:  2 g Take 2 Caps by mouth daily (with breakfast). Indications: HYPERTRIGLYCERIDEMIA Quantity:  180 Cap Refills:  3 Where to Get Your Medications Information on where to get these meds will be given to you by the nurse or doctor. ! Ask your nurse or doctor about these medications  
  albuterol 90 mcg/actuation inhaler budesonide-formoterol 160-4.5 mcg/actuation HFA inhaler  
 divalproex  mg ER tablet DULoxetine 60 mg capsule  
 gabapentin 300 mg capsule  
 lacosamide 200 mg Tab tablet  
 zolpidem 10 mg tablet

## 2017-05-04 NOTE — PROGRESS NOTES
LM on secure VM to Make sure she is taking Depakote daily as ordered and make sure she makes F/U appt w/ neurologist. Aracely Solders to return call for any questions.

## 2017-05-04 NOTE — IP AVS SNAPSHOT
[address] [id number] [address] Patient: Grecia Chan MRN: [id number] :1965 You are allergic to the following Allergen Reactions Ampicillin Anaphylaxis Penicillin G Anaphylaxis Adhesive Tape-Silicones Rash Tegretol (Carbamazepine) Rash Recent Documentation Height Weight BMI OB Status Smoking Status 1.549 m 49.9 kg 20.78 kg/m2 Postmenopausal Current Every Day Smoker Emergency Contacts Name Discharge Info Relation Home Work Mobile Abelardo Castillo CAREGIVER [3] Boyfriend [17] [phone] Venecia Ta  Parent [1] [phone] About your hospitalization You were admitted on: May 5, 2017 You last received care in the:  [facility] 1 SPECIAL TRTMT 1 You were discharged on: May 8, 2017 Unit phone number:  [phone] Why you were hospitalized Your primary diagnosis was:  Not on File Your diagnoses also included:  Depression With Suicidal Ideation Providers Seen During Your Hospitalizations Provider Role Specialty Primary office phone Perfecto Edward MD Attending Provider Emergency Medicine [phone] Flower Raygoza MD Attending Provider Emergency Medicine [phone] Rom Griffiths MD Attending Provider Psychiatry [phone] Your Primary Care Physician (PCP) Primary Care Physician Office Phone Office Fax RafaGege meza Gracy [phone] Follow-up Information Follow up With Details Comments Contact Info Pt given card with numbers to remember. Patient has an appointment scheduled with  [facility] on 5/10/17 @9:30 am, [address] [facility], [facility] Debra Johnson, [phone]. Current Discharge Medication List  
  
START taking these medications Dose & Instructions Dispensing Information Comments Morning Noon Evening Bedtime  
 gabapentin 300 mg capsule Commonly known as:  NEURONTIN Your last dose was: Your next dose is:    
   
   
 Dose:  300 mg Take 1 Cap by mouth three (3) times daily for 30 days. Indications: NEUROPATHIC PAIN, mood stabilizer Quantity:  90 Cap Refills:  0  
     
   
   
   
  
 zolpidem 10 mg tablet Commonly known as:  AMBIEN Your last dose was: Your next dose is:    
   
   
 Dose:  10 mg Take 1 Tab by mouth nightly for 30 days. Max Daily Amount: 10 mg. Indications: SLEEP-ONSET INSOMNIA Quantity:  30 Tab Refills:  0 CONTINUE these medications which have CHANGED Dose & Instructions Dispensing Information Comments Morning Noon Evening Bedtime * albuterol 90 mcg/actuation inhaler Commonly known as:  VENTOLIN HFA What changed:  Another medication with the same name was added. Make sure you understand how and when to take each. Your last dose was: Your next dose is:    
   
   
 Dose:  2 Puff Take 2 Puffs by inhalation every four (4) hours as needed for Wheezing or Shortness of Breath (Cough). Indications: CHRONIC OBSTRUCTIVE PULMONARY DISEASE Quantity:  3 Inhaler Refills:  3  
     
   
   
   
  
 * albuterol 90 mcg/actuation inhaler Commonly known as:  PROVENTIL HFA, VENTOLIN HFA, PROAIR HFA What changed: You were already taking a medication with the same name, and this prescription was added. Make sure you understand how and when to take each. Your last dose was: Your next dose is:    
   
   
 Dose:  2 Puff Take 2 Puffs by inhalation every four (4) hours as needed for Wheezing or Shortness of Breath for up to 30 days. Indications: Acute Asthma Attack Quantity:  1 Inhaler Refills:  0  
     
   
   
   
  
 * budesonide-formoterol 160-4.5 mcg/actuation HFA inhaler Commonly known as:  SYMBICORT  
 What changed:  Another medication with the same name was added. Make sure you understand how and when to take each. Your last dose was: Your next dose is:    
   
   
 Dose:  2 Puff Take 2 Puffs by inhalation two (2) times a day. Quantity:  3 Inhaler Refills:  3  
     
   
   
   
  
 * budesonide-formoterol 160-4.5 mcg/actuation HFA inhaler Commonly known as:  SYMBICORT What changed: You were already taking a medication with the same name, and this prescription was added. Make sure you understand how and when to take each. Your last dose was: Your next dose is:    
   
   
 Dose:  2 Puff Take 2 Puffs by inhalation two (2) times a day. Indications: BRONCHOSPASM PREVENTION WITH COPD Quantity:  1 Inhaler Refills:  0  
     
   
   
   
  
 divalproex  mg ER tablet Commonly known as:  DEPAKOTE ER What changed:   
- medication strength 
- how much to take 
- how to take this - when to take this 
- additional instructions Your last dose was: Your next dose is:    
   
   
 Dose:  1250 mg Take 5 Tabs by mouth nightly for 30 days. Indications: RAPID CYCLING BIPOLAR AFFECTIVE DISORDER, Mood stabilizer. Quantity:  150 Tab Refills:  0  
     
   
   
   
  
 * DULoxetine 60 mg capsule Commonly known as:  CYMBALTA What changed:  Another medication with the same name was added. Make sure you understand how and when to take each. Your last dose was: Your next dose is:    
   
   
 Dose:  60 mg Take 1 Cap by mouth daily. Quantity:  90 Cap Refills:  3  
     
   
   
   
  
 * DULoxetine 60 mg capsule Commonly known as:  CYMBALTA What changed: You were already taking a medication with the same name, and this prescription was added. Make sure you understand how and when to take each. Your last dose was:     
   
Your next dose is:    
   
   
 Dose:  60 mg  
 Take 1 Cap by mouth daily for 30 days. Indications: ANXIETY WITH DEPRESSION, major depressive disorder Quantity:  30 Cap Refills:  0  
     
   
   
   
  
 * lacosamide 200 mg Tab tablet Commonly known as:  VIMPAT What changed:  Another medication with the same name was added. Make sure you understand how and when to take each. Your last dose was: Your next dose is:    
   
   
 Dose:  200 mg Take 1 Tab by mouth two (2) times a day. Indications: PARTIAL EPILEPSY TREATMENT ADJUNCT Quantity:  180 Tab Refills:  3  
     
   
   
   
  
 * lacosamide 200 mg Tab tablet Commonly known as:  VIMPAT What changed: You were already taking a medication with the same name, and this prescription was added. Make sure you understand how and when to take each. Your last dose was: Your next dose is:    
   
   
 Dose:  200 mg Take 1 Tab by mouth two (2) times a day for 30 days. Max Daily Amount: 400 mg. Indications: FOCAL EPILEPSY Quantity:  60 Tab Refills:  0  
     
   
   
   
  
 * Notice: This list has 8 medication(s) that are the same as other medications prescribed for you. Read the directions carefully, and ask your doctor or other care provider to review them with you. CONTINUE these medications which have NOT CHANGED Dose & Instructions Dispensing Information Comments Morning Noon Evening Bedtime  
 omega-3 acid ethyl esters 1 gram capsule Commonly known as:  Elder Kos Your last dose was: Your next dose is:    
   
   
 Dose:  2 g Take 2 Caps by mouth daily (with breakfast). Indications: HYPERTRIGLYCERIDEMIA Quantity:  180 Cap Refills:  3 Where to Get Your Medications Information on where to get these meds will be given to you by the nurse or doctor. ! Ask your nurse or doctor about these medications  
  albuterol 90 mcg/actuation inhaler budesonide-formoterol 160-4.5 mcg/actuation HFA inhaler  
 divalproex  mg ER tablet DULoxetine 60 mg capsule  
 gabapentin 300 mg capsule  
 lacosamide 200 mg Tab tablet  
 zolpidem 10 mg tablet Discharge Instructions BEHAVIORAL HEALTH NURSING DISCHARGE NOTE The following personal items collected during your admission are returned to you:  
Dental Appliance: Dental Appliances: Partials, With patient Vision:   
Hearing Aid:   
Jewelry: Jewelry: None Clothing: Clothing: Belt, Footwear, Pants, Shirt, Undergarments, With patient Other Valuables: Other Valuables: None Valuables sent to safe:   
 
 
PATIENT INSTRUCTIONS: 
 
 
Regular diet The discharge information has been reviewed with the patient. The patient verbalized understanding. Patient armband removed and shredded Discharge Orders None [employer] Announcement We are excited to announce that we are making your provider's discharge notes available to you in [employer]. You will see these notes when they are completed and signed by the physician that discharged you from your recent hospital stay. If you have any questions or concerns about any information you see in Immun[employer]ine, please call the Health Information Department where you were seen or reach out to your Primary Care Provider for more information about your plan of care. Introducing [facility]! Rahul Olivo introduces Immunov[employer]ine patient portal. Now you can access parts of your medical record, email your doctor's office, and request medication refills online. 1. In your internet browser, go to [email or web address] 2. Click on the First Time User? Click Here link in the Sign In box. You will see the New Member Sign Up page. 3. Enter your Immunov[employer]ine Access Code exactly as it appears below. You will not need to use this code after youve completed the sign-up process.  If you do not sign up before the expiration date, you must request a new code. · Element[employer] Access Code: [email or web address] Expires: 7/13/2017  4:02 PM 
 
4. Enter the last four digits of your Social Security Number (xxxx) and Date of Birth (mm/dd/yyyy) as indicated and click Submit. You will be taken to the next sign-up page. 5. Create a Elementum ID. This will be your Elementum login ID and cannot be changed, so think of one that is secure and easy to remember. 6. Create a Element[employer] password. You can change your password at any time. 7. Enter your Password Reset Question and Answer. This can be used at a later time if you forget your password. 8. Enter your e-mail address. You will receive e-mail notification when new information is available in [address] 9. Click Sign Up. You can now view and download portions of your medical record. 10. Click the Download Summary menu link to download a portable copy of your medical information. If you have questions, please visit the Frequently Asked Questions section of the [employer] website. Remember, Element[employer] is NOT to be used for urgent needs. For medical emergencies, dial 911. Now available from your iPhone and Android! General Information Please provide this summary of care documentation to your next provider. Patient Signature:  ____________________________________________________________ Date:  ____________________________________________________________  
  
[facility] Provider Signature:  ____________________________________________________________ Date:  ____________________________________________________________

## 2017-05-05 PROBLEM — R45.851 DEPRESSION WITH SUICIDAL IDEATION: Status: ACTIVE | Noted: 2017-05-05

## 2017-05-05 PROBLEM — F32.A DEPRESSION WITH SUICIDAL IDEATION: Status: ACTIVE | Noted: 2017-05-05

## 2017-05-05 LAB
AMPHET UR QL SCN: NEGATIVE
APPEARANCE UR: CLEAR
BARBITURATES UR QL SCN: NEGATIVE
BENZODIAZ UR QL: POSITIVE
BILIRUB UR QL: NEGATIVE
CANNABINOIDS UR QL SCN: POSITIVE
COCAINE UR QL SCN: POSITIVE
COLOR UR: YELLOW
GLUCOSE UR STRIP.AUTO-MCNC: NEGATIVE MG/DL
HDSCOM,HDSCOM: ABNORMAL
HGB UR QL STRIP: NEGATIVE
KETONES UR QL STRIP.AUTO: NEGATIVE MG/DL
LEUKOCYTE ESTERASE UR QL STRIP.AUTO: NEGATIVE
METHADONE UR QL: NEGATIVE
NITRITE UR QL STRIP.AUTO: NEGATIVE
OPIATES UR QL: POSITIVE
PCP UR QL: NEGATIVE
PH UR STRIP: 7 [PH] (ref 5–8)
PROT UR STRIP-MCNC: NEGATIVE MG/DL
SP GR UR REFRACTOMETRY: 1.01 (ref 1–1.03)
UROBILINOGEN UR QL STRIP.AUTO: 1 EU/DL (ref 0.2–1)
VALPROATE SERPL-MCNC: 6 UG/ML (ref 50–100)

## 2017-05-05 PROCEDURE — 74011250637 HC RX REV CODE- 250/637: Performed by: PSYCHIATRY & NEUROLOGY

## 2017-05-05 PROCEDURE — 80307 DRUG TEST PRSMV CHEM ANLYZR: CPT | Performed by: PHYSICIAN ASSISTANT

## 2017-05-05 PROCEDURE — 80164 ASSAY DIPROPYLACETIC ACD TOT: CPT | Performed by: PSYCHIATRY & NEUROLOGY

## 2017-05-05 PROCEDURE — 65220000003 HC RM SEMIPRIVATE PSYCH

## 2017-05-05 PROCEDURE — 81003 URINALYSIS AUTO W/O SCOPE: CPT | Performed by: PHYSICIAN ASSISTANT

## 2017-05-05 PROCEDURE — 36415 COLL VENOUS BLD VENIPUNCTURE: CPT | Performed by: PSYCHIATRY & NEUROLOGY

## 2017-05-05 RX ORDER — DIVALPROEX SODIUM 500 MG/1
1500 TABLET, EXTENDED RELEASE ORAL
Status: DISCONTINUED | OUTPATIENT
Start: 2017-05-05 | End: 2017-05-05

## 2017-05-05 RX ORDER — HYDROXYZINE PAMOATE 50 MG/1
50 CAPSULE ORAL
Status: DISCONTINUED | OUTPATIENT
Start: 2017-05-05 | End: 2017-05-08 | Stop reason: HOSPADM

## 2017-05-05 RX ORDER — TRAZODONE HYDROCHLORIDE 50 MG/1
50 TABLET ORAL
Status: DISCONTINUED | OUTPATIENT
Start: 2017-05-05 | End: 2017-05-08 | Stop reason: HOSPADM

## 2017-05-05 RX ORDER — PROMETHAZINE HYDROCHLORIDE 25 MG/1
25 TABLET ORAL
Status: DISCONTINUED | OUTPATIENT
Start: 2017-05-05 | End: 2017-05-08 | Stop reason: HOSPADM

## 2017-05-05 RX ORDER — CLONIDINE HYDROCHLORIDE 0.1 MG/1
0.1 TABLET ORAL 2 TIMES DAILY
Status: COMPLETED | OUTPATIENT
Start: 2017-05-07 | End: 2017-05-07

## 2017-05-05 RX ORDER — HALOPERIDOL 5 MG/ML
5 INJECTION INTRAMUSCULAR
Status: DISCONTINUED | OUTPATIENT
Start: 2017-05-05 | End: 2017-05-08 | Stop reason: HOSPADM

## 2017-05-05 RX ORDER — BUDESONIDE AND FORMOTEROL FUMARATE DIHYDRATE 160; 4.5 UG/1; UG/1
2 AEROSOL RESPIRATORY (INHALATION)
Status: DISCONTINUED | OUTPATIENT
Start: 2017-05-05 | End: 2017-05-08 | Stop reason: HOSPADM

## 2017-05-05 RX ORDER — ALBUTEROL SULFATE 90 UG/1
2 AEROSOL, METERED RESPIRATORY (INHALATION)
Status: DISCONTINUED | OUTPATIENT
Start: 2017-05-05 | End: 2017-05-05 | Stop reason: CLARIF

## 2017-05-05 RX ORDER — ZOLPIDEM TARTRATE 5 MG/1
10 TABLET ORAL
Status: DISCONTINUED | OUTPATIENT
Start: 2017-05-05 | End: 2017-05-08 | Stop reason: HOSPADM

## 2017-05-05 RX ORDER — LACOSAMIDE 50 MG/1
200 TABLET ORAL 2 TIMES DAILY
Status: DISCONTINUED | OUTPATIENT
Start: 2017-05-05 | End: 2017-05-08 | Stop reason: HOSPADM

## 2017-05-05 RX ORDER — CLONIDINE HYDROCHLORIDE 0.1 MG/1
0.1 TABLET ORAL 3 TIMES DAILY
Status: COMPLETED | OUTPATIENT
Start: 2017-05-06 | End: 2017-05-06

## 2017-05-05 RX ORDER — GABAPENTIN 300 MG/1
300 CAPSULE ORAL 3 TIMES DAILY
Status: DISCONTINUED | OUTPATIENT
Start: 2017-05-05 | End: 2017-05-08 | Stop reason: HOSPADM

## 2017-05-05 RX ORDER — HALOPERIDOL 5 MG/1
5 TABLET ORAL
Status: DISCONTINUED | OUTPATIENT
Start: 2017-05-05 | End: 2017-05-08 | Stop reason: HOSPADM

## 2017-05-05 RX ORDER — DULOXETIN HYDROCHLORIDE 30 MG/1
60 CAPSULE, DELAYED RELEASE ORAL DAILY
Status: DISCONTINUED | OUTPATIENT
Start: 2017-05-05 | End: 2017-05-08 | Stop reason: HOSPADM

## 2017-05-05 RX ORDER — CLONIDINE HYDROCHLORIDE 0.1 MG/1
0.1 TABLET ORAL 4 TIMES DAILY
Status: COMPLETED | OUTPATIENT
Start: 2017-05-05 | End: 2017-05-05

## 2017-05-05 RX ORDER — OMEGA-3-ACID ETHYL ESTERS 1 G/1
2 CAPSULE, LIQUID FILLED ORAL
Status: DISCONTINUED | OUTPATIENT
Start: 2017-05-05 | End: 2017-05-08 | Stop reason: HOSPADM

## 2017-05-05 RX ORDER — DIVALPROEX SODIUM 250 MG/1
1000 TABLET, DELAYED RELEASE ORAL 2 TIMES DAILY
Status: DISCONTINUED | OUTPATIENT
Start: 2017-05-05 | End: 2017-05-05

## 2017-05-05 RX ORDER — HYDROXYZINE 50 MG/ML
50 INJECTION, SOLUTION INTRAMUSCULAR
Status: DISCONTINUED | OUTPATIENT
Start: 2017-05-05 | End: 2017-05-08 | Stop reason: HOSPADM

## 2017-05-05 RX ORDER — IBUPROFEN 600 MG/1
600 TABLET ORAL
Status: DISCONTINUED | OUTPATIENT
Start: 2017-05-05 | End: 2017-05-08

## 2017-05-05 RX ORDER — PROMETHAZINE HYDROCHLORIDE 25 MG/ML
25 INJECTION, SOLUTION INTRAMUSCULAR; INTRAVENOUS
Status: DISCONTINUED | OUTPATIENT
Start: 2017-05-05 | End: 2017-05-08 | Stop reason: HOSPADM

## 2017-05-05 RX ORDER — METHOCARBAMOL 500 MG/1
750 TABLET, FILM COATED ORAL
Status: DISCONTINUED | OUTPATIENT
Start: 2017-05-05 | End: 2017-05-08 | Stop reason: HOSPADM

## 2017-05-05 RX ORDER — ALBUTEROL SULFATE 0.83 MG/ML
2.5 SOLUTION RESPIRATORY (INHALATION)
Status: DISCONTINUED | OUTPATIENT
Start: 2017-05-05 | End: 2017-05-07 | Stop reason: SDUPTHER

## 2017-05-05 RX ADMIN — CLONIDINE HYDROCHLORIDE 0.1 MG: 0.1 TABLET ORAL at 20:59

## 2017-05-05 RX ADMIN — CLONIDINE HYDROCHLORIDE 0.1 MG: 0.1 TABLET ORAL at 08:37

## 2017-05-05 RX ADMIN — DULOXETINE HYDROCHLORIDE 60 MG: 30 CAPSULE, DELAYED RELEASE ORAL at 08:37

## 2017-05-05 RX ADMIN — DIVALPROEX SODIUM 1000 MG: 250 TABLET, DELAYED RELEASE ORAL at 08:37

## 2017-05-05 RX ADMIN — OMEGA-3-ACID ETHYL ESTERS 2000 MG: 1 CAPSULE, LIQUID FILLED ORAL at 09:09

## 2017-05-05 RX ADMIN — BUDESONIDE AND FORMOTEROL FUMARATE DIHYDRATE 2 PUFF: 160; 4.5 AEROSOL RESPIRATORY (INHALATION) at 09:09

## 2017-05-05 RX ADMIN — CLONIDINE HYDROCHLORIDE 0.1 MG: 0.1 TABLET ORAL at 17:17

## 2017-05-05 RX ADMIN — GABAPENTIN 300 MG: 300 CAPSULE ORAL at 21:00

## 2017-05-05 RX ADMIN — CLONIDINE HYDROCHLORIDE 0.1 MG: 0.1 TABLET ORAL at 12:23

## 2017-05-05 RX ADMIN — GABAPENTIN 300 MG: 300 CAPSULE ORAL at 14:25

## 2017-05-05 RX ADMIN — GABAPENTIN 300 MG: 300 CAPSULE ORAL at 08:37

## 2017-05-05 RX ADMIN — DIVALPROEX SODIUM 1250 MG: 500 TABLET, FILM COATED, EXTENDED RELEASE ORAL at 20:59

## 2017-05-05 RX ADMIN — LACOSAMIDE 200 MG: 50 TABLET, FILM COATED ORAL at 21:00

## 2017-05-05 RX ADMIN — BUDESONIDE AND FORMOTEROL FUMARATE DIHYDRATE 2 PUFF: 160; 4.5 AEROSOL RESPIRATORY (INHALATION) at 20:58

## 2017-05-05 RX ADMIN — ZOLPIDEM TARTRATE 10 MG: 5 TABLET, FILM COATED ORAL at 20:56

## 2017-05-05 RX ADMIN — LACOSAMIDE 200 MG: 50 TABLET, FILM COATED ORAL at 09:09

## 2017-05-05 NOTE — PROGRESS NOTES
Albuterol NEB was therapeutically interchanged for Albuterol INHALER per the P&T Committee approved Therapeutic Interchanges Policy.     Gyala Camarillo [facility], Pharmacist  5/5/2017 6:15 AM

## 2017-05-05 NOTE — ED NOTES
ADDENDUM      Patient's care was signed over to me at 2100 on 5/4/17. Patient's care signed over to me pending crisis evaluation and final disposition. Pt resting without complaints at this time. 4:08 AM Nicholas Fraser with crisis has evaluated patient and agrees with inpatient bed at [facility]. IMPRESSION:   1. Suicidal ideation    2. Heroin abuse    3.  Chronic obstructive pulmonary disease, unspecified COPD type ([facility] [address]        DISPO: Admitted    Susi Duff MD

## 2017-05-05 NOTE — ED PROVIDER NOTES
HPI Comments: Patient with history of heroin abuse presents via walk-in triage with suicidal ideation. States that she is concerned that she may harm herself if she  continues to use heroin. She denies any plan denies any actions to harm herself today. Denies any homicidal ideation denies any auditory or visual hallucinations. She denies any alcohol or other drug use other than marijuana. Has mild shortness of breath does have a history of COPD denies any chest pain, no fever noted productive sputum. She is just requesting medication for withdrawal her last heroin usage was yesterday night    Patient is a 46 y.o. female presenting with drug problem. Drug Problem   Pertinent negatives include no fever. Associated medical issues include suicidal ideas. Past Medical History:   Diagnosis Date    Chronic obstructive pulmonary disease ([facility] [address] 2016    severe. PFT confirmed. 2016    COPD     Cord compression [facility])     Dr Lucero Gibbs Current smoker 2016    Depression     Hepatitis C antibody test positive 2015    Negative viral load, Immune    History of cocaine abuse     History of heroin abuse     Hypercholesterolemia     Other diseases of lung, not elsewhere classified     pneumonia    Seizures ([facility] [address] 2007    complex partial, unk etiology; Dr. Mya Smith. Venita Blackwell, Neurology-    Spinal stenosis     Thyroid disease     many years per pt    Tobacco use        Past Surgical History:   Procedure Laterality Date    HX CERVICAL DISKECTOMY      HX  SECTION      HX GYN            HX TUBAL LIGATION           Family History:   Problem Relation Age of Onset    Cancer Father 48     lung    Lung Disease Father     Cancer Maternal Grandmother      lung    Lung Disease Maternal Grandmother        Social History     Social History    Marital status: UNKNOWN     Spouse name: N/A    Number of children: 2    Years of education: 10     Occupational History    Not on file.      Social History Main Topics    Smoking status: Current Every Day Smoker     Packs/day: 0.25     Years: 35.00     Types: Cigarettes    Smokeless tobacco: Never Used    Alcohol use No    Drug use: 7.00 per week     Special: Heroin, Marijuana      Comment:  states it has been awhile since she did Heroin.  Sexual activity: Yes     Partners: Male     Birth control/ protection: Condom     Other Topics Concern     Service No    Blood Transfusions No    Caffeine Concern No    Occupational Exposure No    Hobby Hazards No    Sleep Concern No    Stress Concern No    Weight Concern No    Special Diet No    Back Care No    Exercise No    Bike Helmet No    Seat Belt Yes    Self-Exams No     Social History Narrative         ALLERGIES: Ampicillin; Penicillin g; Adhesive tape-silicones; and Tegretol [carbamazepine]    Review of Systems   Constitutional: Negative for fever. HENT: Negative for nosebleeds. Eyes: Negative for visual disturbance. Respiratory: Negative for shortness of breath. Cardiovascular: Negative for chest pain. Gastrointestinal: Negative for blood in stool. Genitourinary: Negative for dysuria. Musculoskeletal: Negative for myalgias. Skin: Negative for rash. Neurological: Negative for headaches. Psychiatric/Behavioral: Positive for suicidal ideas. All other systems reviewed and are negative. Vitals:    05/04/17 1658   BP: 115/84   Pulse: (!) 106   Resp: 16   Temp: 98.5 °F (36.9 °C)   SpO2: 99%   Weight: 49.9 kg (110 lb)   Height: 5' 1\" (1.549 m)            Physical Exam   Constitutional:   General:  Well-developed, well-nourished, nontoxic nondiaphoretic. Head:  Normocephalic atraumatic. Eyes:  Pupils midrange extraocular movements intact. No pallor or conjunctival injection. Nose:  No rhinorrhea, inspection grossly normal.    Ears:  Grossly normal to inspection, no discharge. Mouth:  Mucous membranes moist, no appreciable intraoral lesion.     Neck: Trachea midline, no asymmetry. Chest:  Grossly normal inspection, symmetric chest rise. Pulmonary: Symmetric wheezing both lung fields no focal rhonchi. Good air movement. Cardiovascular:  S1-S2 no murmurs rubs or gallops. Abdomen: Soft, nontender, nondistended no guarding rebound or peritoneal signs. Extremities:  Grossly normal to inspection, peripheral pulses intact. Neurologic:  Alert and oriented no appreciable focal neurologic deficit. Psychiatric:  Grossly normal mood and affect. Nursing note reviewed, vital signs reviewed. MDM  Number of Diagnoses or Management Options  Diagnosis management comments: ED course:  Patient presents with suicidal ideation heroin abuse and early withdrawal symptoms. She is afebrile mildly tachycardic likely due to her with withdrawal symptoms saturation normal on room air. Given breathing treatments, started on steroid. We'll start her on clonidine, Vistaril, ondansetron. Labs: Alcohol not elevated white count not elevated    Based upon my review of the labs, imaging and current status of patient there does not appear to be any acute medical condition that would prevent transfer to a mental health facility. Consult:  Discussed care with Jerson Gaitan. Standard discussion; including history of patients chief complaint, available diagnostic results, and treatment course. ED Course       Procedures      It is now the end of my shift, I am still awaiting crisis evaluation. Patient will be signed out to the oncoming physician Dr. Shahid Michele at 2100. Disposition:    Pending      Portions of this chart were created with Dragon medical speech to text program.   Unrecognized errors may be present.

## 2017-05-05 NOTE — BSMART NOTE
ACTIVITIES THERAPY PROGRESS NOTE    Group time:1120  . The patient did not awaken/get up when called for group.

## 2017-05-05 NOTE — BSMART NOTE
ART THERAPY GROUP PROGRESS NOTE    Group time:1415    The patient declined group despite encouragement. In bed.

## 2017-05-05 NOTE — BSMART NOTE
Pt seen by Crisis in ED room 5. CC: SI with plan, opiate dependence, poly SA. Pt is alert, oriented, cooperative. Pt endorsed SI with plan to walk in traffic or cut her wrists. Pt denies HI or hallucinations. Flat affect, depressed mood. Thoughts are goal directed, organized. Pt with long hx of addiction presents to the ED with SI and plan \" if I dont get help I will do what ever it takes to kill my self\". Pt reports prior adm here at [facility] in March of this year. She reports being followed by the [facility] and [facility] and that it has not been doing any good. She admits she has not made or followed up as directed with them. Pt reports taking her medications as Rx'd. She reports a hx of Seizures with her last just 3 days ago. Reports using heroin 3-4 caps by snorting daily. UDSC pos for Benzo's, THC, Cocaine and Opiates. Pt minimizes her use of the other drugs and reports she will only have W/D symptoms from the Heroin. Denies daily alcohol. Bal neg on adm to the ED. Pt presents a danger to herself and requires in pt tx for safety. Discussed with on call Psychiatry , orders for adm received.

## 2017-05-05 NOTE — ED NOTES
TRANSFER - OUT REPORT:    Verbal report given to Sarah Aquino RN(name) on Conception Sarai  being transferred to [facility] Adult unit(unit) for routine progression of care     Report consisted of patients Situation, Background, Assessment and   Recommendations(SBAR). Information from the following report(s) SBAR, ED Summary, Procedure Summary, MAR and Recent Results was reviewed with the receiving nurse. Opportunity for questions and clarification was provided.       Patient transported with security

## 2017-05-05 NOTE — H&P
BEHAVIORAL HEALTH ADMISSION NOTE    Patient: Moni Jhaveri               Sex: female          DOA: 5/4/2017       YOB: 1965      Age:  46 y.o.        LOS:  LOS: 0 days     HISTORY OF PRESENT ILLNESS:       CC: Depression w/ SI and Heroin Detox    HPI: Chart review:   The patient is a 46years old single, unemployed, domicile (lives with her friend) WF w/ a PPHx of depression and polysubstance (cocaine, IV heroin (used since 6years old), and marijuana) used. Per chart, patient was recently admitted under TDO status for depression and heroin w/d detox at [facility] on 3/16/2017 and was discharged on 3/20/2017 once the patient heroin w/d symptoms improved, mood and anxiety stabilized. Patient was discharged on ADM, Mood stabilizer, Sleeping aide, and medical medications. Unlike last admission patient brought self to [facility] ED and requests admission under volunteer status for depression, SI, and heroin detox. Per chart patient reported she used IV heroin prior to this admission. At present, patient voices that she desperately needs help to stop using drug and stay sober. Patient reports having opioid w/d symptoms and would like to continue detox. Patient denies any thoughts to harm self or others because she feels safe in the hospital. She is hopeful because she will get the help here to improve her drugs w/d symptoms. She denies manic-depressive symptoms just depressive symptoms. She also denies paranoid delusion and A/V/H. She is willing to comply w/ the recommended treatment plan in order to improve her opiate w/d symptoms and psychiatric symptoms. She is willing to comply with outpatient orders. She denies having accessed to firearms or weapons. Patient reports that she was sober for > 5 yrs because she was in incarcerated duet to assaulting an officer and obstructive of justice. Patient doesnt want to go to a Residential program or Sober living facility after stabilize.  She prefer AA/NA meeting     Active Problems:    Depression with suicidal ideation (2017)         Past Medical History:   Diagnosis Date    Chronic obstructive pulmonary disease ([facility] [address] 2016    severe. PFT confirmed. 2016    COPD     Cord compression [facility])     Dr Ritu Hernandez Current smoker 2016    Depression     Hepatitis C antibody test positive 2015    Negative viral load, Immune    History of cocaine abuse     History of heroin abuse     Hypercholesterolemia     Other diseases of lung, not elsewhere classified     pneumonia    Seizures ([facility] [address] 2007    complex partial, unk etiology; Dr. Aime Herrera. [place], Neurology-    Spinal stenosis     Thyroid disease     many years per pt    Tobacco use         Past Surgical History:   Procedure Laterality Date    HX CERVICAL DISKECTOMY      HX  SECTION      HX GYN            HX TUBAL LIGATION         Social History   Substance Use Topics    Smoking status: Current Every Day Smoker     Packs/day: 0.25     Years: 35.00     Types: Cigarettes    Smokeless tobacco: Never Used    Alcohol use No        Family History   Problem Relation Age of Onset    Cancer Father 48     lung    Lung Disease Father     Cancer Maternal Grandmother      lung    Lung Disease Maternal Grandmother         Allergies   Allergen Reactions    Ampicillin Anaphylaxis    Penicillin G Anaphylaxis    Adhesive Tape-Silicones Rash    Tegretol [Carbamazepine] Rash        Prior to Admission medications    Medication Sig Start Date End Date Taking? Authorizing Provider   divalproex ER (DEPAKOTE ER) 500 mg ER tablet Take 2 tabs in the morning and 2 tabs in the evening for seizures. 17   Kavita Brunson NP   omega-3 acid ethyl esters (LOVAZA) 1 gram capsule Take 2 Caps by mouth daily (with breakfast).  Indications: HYPERTRIGLYCERIDEMIA 16   Kavita Brunson NP   budesonide-formoterol [facility]) 160-4.5 mcg/actuation HFA inhaler Take 2 Puffs by inhalation two (2) times a day. 7/25/16   Padma Castillo NP   lacosamide (VIMPAT) 200 mg tab tablet Take 1 Tab by mouth two (2) times a day. Indications: PARTIAL EPILEPSY TREATMENT ADJUNCT 7/25/16   Nava Jeferson Sharif NP   DULoxetine (CYMBALTA) 60 mg capsule Take 1 Cap by mouth daily. 7/25/16   Padma Castillo NP   albuterol (VENTOLIN HFA) 90 mcg/actuation inhaler Take 2 Puffs by inhalation every four (4) hours as needed for Wheezing or Shortness of Breath (Cough). Indications: CHRONIC OBSTRUCTIVE PULMONARY DISEASE 7/25/16   Padma Castillo NP       VITALS:    Visit Vitals    /78    Pulse 83    Temp 98.7 °F (37.1 °C)    Resp 18    Ht 5' 1\" (1.549 m)    Wt 49.9 kg (110 lb)    LMP 07/29/2012    SpO2 98%    BMI 20.78 kg/m2       Labs: Reviewed and in chart  PSYCHIATRIC HISTORY:  DIAGNOSIS: depression and polysubstance (cocaine, IV heroin (used since 6years old), and marijuana) used. CURRENT PSYCHIATRIST: JOHNIE  THERAPIST: TBD  ADMISSIONS: Multiple. Recently admitted under TDO status for depression and heroin w/d detox at [facility] on 3/16/2017 and was discharged on 3/20/2017   SUICIDE ATTEMPTS: Yes but she doesn't want to elaborate      REVIEW OF SYSTEMS:     GENERAL:Patient alert, awake and oriented times 3, able to communicate full sentences and not in distress. HEENT: No change in vision, no earache, tinnitus, sore throat or sinus congestion. NECK: No pain or stiffness. PULMONARY: No shortness of breath, cough or wheeze. GASTROINTESTINAL: No abdominal pain, nausea, vomiting or diarrhea, melena or bright red blood per rectum. GENITOURINARY: No urinary frequency, urgency, hesitancy or dysuria. MUSCULOSKELETAL: No joint or muscle pain, no back pain, no recent trauma. DERMATOLOGIC: No rash, no itching, no lesions. ENDOCRINE: No polyuria, polydipsia, no heat or cold intolerance. No recent change in weight. HEMATOLOGICAL: No anemia or easy bruising or bleeding.  \NEUROLOGIC: No headache, seizures, numbness, tingling or weakness. \denies f/c, pain, n/v, d/c, SOB, CP, weakness/numbness, difficulty urinating    MINI MENTAL STATUS EXAM: :   Orientation- Oriented in all spheres  Short-term memory: shows no evidence of impairment  Attention:Normal  Repeat phrase \"no ifs, ands, or buts. \"  Follow three stage command- follow written command (CLOSE YOUR EYES)\- write a spontaneous sentence  Copy a simple design      MENTAL STATUS EXAM:  Appearance:shows no evidence of impairment  Behavior: shows no evidence of impairment  Motor: within normal limits  Speech: shows no evidence of impairment  Mood: anxious and depressed  Affect: anxious and depressed  Thought Process: shows no evidence of impairment  Thought Content: no evidence of impairment  Perception: None elicited. Cognition:  appropriate decision making  Insight: The patient shows little insight  Judgment: is psychologically impaired    RISK ASSESSMENT:   Prior Attempts: NO  Lethality of Attempts: NO  Weapons at [address] at Home: NO  Alcohol/Drug Use: YES  Protective Factors:contacts reliable for safety, denies intent, no organized plan and no means/access to weapons      ASSESSMENT: The patient is a 46years old single, unemployed, domicile (lives with her friend) WF w/ a PPHx of depression and polysubstance (cocaine, IV heroin (used since 6years old), and marijuana) used. Per chart, patient was recently admitted under TDO status for depression and heroin w/d detox at [facility] on 3/16/2017 and was discharged on 3/20/2017 once the patient heroin w/d symptoms improved, mood and anxiety stabilized. Patient was discharged on ADM, Mood stabilizer, Sleeping aide, and medical medications. Unlike last admission patient brought self to the [facility] ED and requests admission under volunteer status for depression, SI, and heroin detox. Per chart patient reported she used IV heroin prior to this admission.  At present, patient voices that she desperately needs help to stop using drug and stay sober. Patient reports having opioid w/d symptoms and would like to continue detox. Patient denies any thoughts to harm self or others because she feels safe in the hospital. She is hopeful because she will get the help here to improve her drugs w/d symptoms. She is willing to comply w/ the recommended treatment plan in order to improve her opiate w/d symptoms and psychiatric symptoms. Axis I: MDD R/S w/o P/F vs Depression due to substance induced. Polysustance use disorder (heroine/marijuana)  Axis II: Deferred  Axis II: COPD, Cord compression, Hep C, Hyperlipidemia, spinal stenosis, Thyroid disease  Axis IV: Poor support, Chronic drugs used, Homeless, Jobless  Axis V: GAF: 30    Plan:  1. Continue with inpatient psychiatric treatment  2. Continue with suicide or assault precautions  3. Patient is to continue with Art/OT and family therapy sessions  4. Will need to talk with outpatient psychiatrist/therapist for more collateral  5. Treatment plan: Restart all home medical medications and consult medicine if possible.  -Patient voices understanding of the risk, benefit, alternative treatment, and the risk of no treatment.   -Patient consents and willing to comply with treatment.   -Start: Clonidine taper protocol for 3 days and hold clonidine if BP is <100/60  - Continue: Supportive measures.   -Psychotropic medications:  -1. Restart: Cymbalta 60 mg PO qAM  -2. Restart: Gabapentin 300 mg PO TID  -3. Restart: Depakote ER 1250 mg PO qHS   -4. Start: Ambien 10 mg PO qHS  -SW will assist with outpatient drugs treatment program options.      6. Labs: None necessary at this time  7. SW to help with disposition    Disposition:  TBD           ___________________________________________________    Attending Physician: Олег Yu MD     ALLERGIES:   Allergies   Allergen Reactions    Ampicillin Anaphylaxis    Penicillin G Anaphylaxis    Adhesive Tape-Silicones Rash    Tegretol [Carbamazepine] Rash SUBSTANCE USE:opiates    Patient Vitals for the past 24 hrs:   Temp Pulse Resp BP SpO2   05/05/17 1138 98.7 °F (37.1 °C) 83 18 108/78 -   05/05/17 0837 96.7 °F (35.9 °C) 93 17 121/90 -   05/05/17 0530 - - - - 98 %   05/05/17 0515 - - - - 97 %   05/05/17 0500 - - - - 98 %   05/05/17 0445 - - - - 99 %   05/05/17 0430 - - - - 99 %   05/05/17 0415 - - - - 100 %   05/05/17 0400 - - - 119/78 99 %   05/05/17 0345 - - - - 98 %   05/05/17 0330 - - - 103/62 98 %   05/05/17 0315 - - - - 100 %   05/05/17 0300 - - - 122/84 97 %   05/05/17 0245 - - - - 97 %   05/05/17 0230 - - - 117/77 97 %   05/05/17 0215 - - - 123/90 98 %   05/05/17 0200 - - - 119/81 97 %   05/05/17 0145 - - - 117/77 96 %   05/05/17 0130 - - - 112/72 96 %   05/05/17 0115 - - - 112/73 96 %   05/05/17 0100 - - - 111/75 97 %   05/05/17 0045 - - - 116/71 100 %   05/05/17 0000 - - - 99/63 93 %   05/04/17 2345 - - - 100/60 93 %   05/04/17 2330 - - - 101/57 94 %   05/04/17 2315 - - - 93/60 94 %   05/04/17 2300 - - - 95/50 96 %   05/04/17 2245 - - - 96/53 99 %   05/04/17 2230 - - - 95/51 97 %   05/04/17 2215 - - - 98/58 98 %   05/04/17 2200 - - - 122/87 99 %   05/04/17 2145 - - - 112/78 100 %   05/04/17 1658 98.5 °F (36.9 °C) (!) 106 16 115/84 99 %

## 2017-05-05 NOTE — PROGRESS NOTES
Patient has been guarded all day. She has stayed in her bed except for meal times and visitation. When staff asked bout her health and wellbeing patient has responded with one word answers and closed body language.  Staff will continue to monitor for health and safety

## 2017-05-05 NOTE — H&P
History and Physical        Patient: Conception Sarai               Sex: female          DOA: 2017         YOB: 1965      Age:  46 y.o.        LOS:  LOS: 0 days        HPI:     Conception Sarai is a 46 y.o. female who was admitted experiencing depression,suicidal ideation and poly drug dependence. Active Problems:    Depression with suicidal ideation (2017)        Past Medical History:   Diagnosis Date    [facility]    severe. PFT confirmed. 2016    COPD     Cord compression [facility])     Dr Karime Jones Current smoker 2016    Depression     Hepatitis C antibody test positive 2015    Negative viral load, Immune    History of cocaine abuse     History of heroin abuse     Hypercholesterolemia     Other diseases of lung, not elsewhere classified     pneumonia    Seizures ([facility]     complex partial, unk etiology; Dr. Keith Hoyos. [place], Neurology-    Spinal stenosis     Thyroid disease     many years per pt    Tobacco use        Past Surgical History:   Procedure Laterality Date    HX CERVICAL DISKECTOMY      HX  SECTION      HX GYN            HX TUBAL LIGATION         Family History   Problem Relation Age of Onset    Cancer Father 48     lung    Lung Disease Father     Cancer Maternal Grandmother      lung    Lung Disease Maternal Grandmother        Social History     Social History    Marital status: UNKNOWN     Spouse name: N/A    Number of children: 2    Years of education: 10     Social History Main Topics    Smoking status: Current Every Day Smoker     Packs/day: 0.25     Years: 35.00     Types: Cigarettes    Smokeless tobacco: Never Used    Alcohol use No    Drug use: 7.00 per week     Special: Heroin, Marijuana      Comment:  states it has been awhile since she did Heroin.     Sexual activity: Yes     Partners: Male     Birth control/ protection: Condom     Other Topics Concern     Service No    Blood Transfusions No    Caffeine Concern No    Occupational Exposure No    Hobby Hazards No    Sleep Concern No    Stress Concern No    Weight Concern No    Special Diet No    Back Care No    Exercise No    Bike Helmet No    Seat Belt Yes    Self-Exams No     Social History Narrative   Patient states she lives with a friend. States appetite and sleep have been poor. She is unemployed. Prior to Admission medications    Medication Sig Start Date End Date Taking? Authorizing Provider   divalproex ER (DEPAKOTE ER) 500 mg ER tablet Take 2 tabs in the morning and 2 tabs in the evening for seizures. 4/4/17   Nusrat Pryor NP   omega-3 acid ethyl esters (LOVAZA) 1 gram capsule Take 2 Caps by mouth daily (with breakfast). Indications: HYPERTRIGLYCERIDEMIA 7/25/16   Nusrat Pryor NP   budesonide-formoterol [facility]) 160-4.5 mcg/actuation HFA inhaler Take 2 Puffs by inhalation two (2) times a day. 7/25/16   Nursat Pryor NP   lacosamide (VIMPAT) 200 mg tab tablet Take 1 Tab by mouth two (2) times a day. Indications: PARTIAL EPILEPSY TREATMENT ADJUNCT 7/25/16   [facility], NP   DULoxetine (CYMBALTA) 60 mg capsule Take 1 Cap by mouth daily. 7/25/16   Nursat Pryor NP   albuterol (VENTOLIN HFA) 90 mcg/actuation inhaler Take 2 Puffs by inhalation every four (4) hours as needed for Wheezing or Shortness of Breath (Cough). Indications: CHRONIC OBSTRUCTIVE PULMONARY DISEASE 7/25/16   Nusrat Pryor NP       Allergies   Allergen Reactions    Ampicillin Anaphylaxis    Penicillin G Anaphylaxis    Adhesive Tape-Silicones Rash    Tegretol [Carbamazepine] Rash       Review of Systems  A comprehensive review of systems was negative except for: \"bad withdrawal symptoms from drug abuse\".       Physical Exam:      Visit Vitals    /90    Pulse 93    Temp 96.7 °F (35.9 °C)    Resp 17    Ht 5' 1\" (1.549 m)    Wt 110 lb (49.9 kg)    SpO2 98%    BMI 20.78 kg/m2       Physical Exam:    General:  Alert, cooperative, poorly nourished, well developed,  female, significant withdrawal symptoms evident, appears stated age. Eyes:  Conjunctivae/corneas clear. PERRL, EOMs intact. Fundi benign   Ears:  Normal TMs and external ear canals both ears. Nose: Nares normal. Septum midline. Mucosa normal. No drainage or sinus tenderness. Mouth/Throat: Lips, mucosa, and tongue normal. Teeth rotted and sparse, and gums normal.   Neck: Supple, symmetrical, trachea midline, no adenopathy, thyroid: no enlargement/tenderness/nodules, no carotid bruit and no JVD. Back:   Symmetric, no curvature. ROM normal. No CVA tenderness. Lungs:   Clear to auscultation bilaterally. Heart:  Regular rate and rhythm, S1, S2 normal, no murmur, click, rub or gallop. Abdomen:   Soft, non-tender. Bowel sounds normal. No masses,  No organomegaly. Extremities: Extremities normal, atraumatic, no cyanosis or edema. Pulses: 2+ and symmetric all extremities. Skin: Skin color, texture, turgor normal. No rashes or lesions   Lymph nodes: Cervical, supraclavicular, and axillary nodes normal.   Neurologic: CNII-XII intact. Normal strength, sensation and reflexes throughout.            Assessment/Plan     Depression  Suicidal ideation  Poly drug addiction  Substance dependence withdrawal symptoms  Labs reviewed (+THC, Opiates, Cocaine, Benzos)  Continue treatment per physician's orders

## 2017-05-05 NOTE — PROGRESS NOTES
NUTRITION    BPA/MST Referral      RECOMMENDATIONS / PLAN:     - Add supplements: Glucerna Shake TID.  - Continue RD inpatient monitoring and evaluation. NUTRITION DIAGNOSIS & INTERVENTIONS:     [x] Meals/Snacks: modified diet    [x] Medical food supplementation: initiate     Nutrition Diagnosis:  Inadequate oral intake related to decreased appetite as evidenced by poor po intake, consuming <50% of meals and only 1-2 meals per day PTA. ASSESSMENT:     Subjective/Objective: C/o nausea/vomiting. Decreased appetite but improved, ate most of breakfast. Agreeable to supplements. Average intake adequate to meet patients estimated nutritional needs:   [x] Yes     [] No    Diet: DIET DIABETIC WITH OPTIONS Consistent Carb 2400kcal; Regular    Food Allergies: NKFA   Chewing/Swallowing Difficulty:  [x] None known     [] Yes:   Current Appetite:   [] Good     [x] Fair     [x] Poor     [] Other:  Appetite/meal intake prior to admission:   [] Good     [] Fair     [x] Poor     [] Other:  Current Meal Intake: No data found. Gastrointestinal Issues:  [] Yes    [] No   Skin Integrity:  WDL    Pertinent Medications:  Reviewed; phenergan    Labs:  Reviewed     Anthropometrics:  Ht Readings from Last 1 Encounters:   05/04/17 5' 1\" (1.549 m)       Last 3 Recorded Weights in this Encounter    05/04/17 1658   Weight: 49.9 kg (110 lb)       Body mass index is 20.78 kg/(m^2).       Weight History: patient reports fluctuations in weight PTA from 100-110 lb and suspects recent weight loss (unsure of amount)     Weight Metrics 5/4/2017 4/14/2017 4/4/2017 3/16/2017 3/14/2017 11/30/2016 11/16/2016   Weight 110 lb 105 lb 101 lb 3.2 oz 100 lb 11.2 oz 110 lb 104 lb 9.6 oz 107 lb 6.4 oz   BMI 20.78 kg/m2 19.84 kg/m2 19.12 kg/m2 19.03 kg/m2 20.78 kg/m2 19.76 kg/m2 20.29 kg/m2       Admitting Diagnosis: Depression with suicidal ideation  Past Medical History:   Diagnosis Date    Chronic obstructive pulmonary disease ([facility] [address] 7/25/2016 severe. PFT confirmed. June 2016    COPD     Cord compression [facility])     Dr Keshia Schultz Current smoker 7/25/2016    Depression     Hepatitis C antibody test positive 6/29/2015    Negative viral load, Immune    History of cocaine abuse     History of heroin abuse     Hypercholesterolemia     Other diseases of lung, not elsewhere classified     pneumonia    Seizures ([facility] [address] 2007    complex partial, unk etiology; Dr. Bala Maxwell. Gerri Echavarria, Neurology-    Spinal stenosis     Thyroid disease     many years per pt    Tobacco use         Education Needs:        [] None identified  [] Identified - Not appropriate at this time  []  Identified and addressed - refer to education log  Learning Limitations:   [] None identified  [] Identified    Cultural, Restorationist & ethnic food preferences identified:  [x] None    [] Yes      ESTIMATED NUTRITION NEEDS:     5681-8809 kcal (MSJx1.2-1.3), 157-170 gm CHO (50% kcal), 40-50 gm protein (0.8-1 gm/kg), 1 mL/kcal  Based on: 50 kg       [x] Actual BW      [] IBW          MONITORING & EVALUATION:     Nutrition Goal(s):   1. Po intake of meals will meet >75% of patient estimated nutritional needs within the next 7 days.   Outcome:   [] Met    []  Not Met   [x] New/Initial Goal    Monitor:  [x] Meal intake    [x] Diet tolerance    [x] Supplement intake    Previous Recommendations (for follow-up assessments only):     []   Implemented       []   Not Implemented (RD to address)         Discharge Planning: diabetic diet   [x]  Participated in care planning, discharge planning, & interdisciplinary rounds as appropriate      Tawana Mckeon, [address], [address] [place] [address]    Pager: [phone]

## 2017-05-05 NOTE — BH NOTES
Patient escorted to the unit by security via w/c is a 46 yr old female admitted due to having suicidal ideations, and wanting drug detox, patient expressed to ER staff that if she didn't get help she would harm herself. Patient is cooperative during assessment, patient stated that she recently used Heroin. Patient denied hallucinations, patient denied delusions, patient denied thoughts of suicide, patient contracted for safety, will continue to monitor.

## 2017-05-05 NOTE — ED NOTES
Pt arrived to the ED with co detox and SI. Pt states she is a heroin addict x 5 years about 2 caps/daily. Pt states, \"If I can't get help, I'm going to kill myself any way I can. \" Pt denies having a plan or HI. Pt agrees to verbal contract for safety. Pt denies ETOH abuse. Hx asthma and COPD. Pt A&Ox4 in no apparent distress at this time.

## 2017-05-06 LAB — GLUCOSE BLD STRIP.AUTO-MCNC: 112 MG/DL (ref 70–110)

## 2017-05-06 PROCEDURE — 65220000003 HC RM SEMIPRIVATE PSYCH

## 2017-05-06 PROCEDURE — 74011000250 HC RX REV CODE- 250: Performed by: PSYCHIATRY & NEUROLOGY

## 2017-05-06 PROCEDURE — 82962 GLUCOSE BLOOD TEST: CPT

## 2017-05-06 PROCEDURE — 74011250637 HC RX REV CODE- 250/637: Performed by: PSYCHIATRY & NEUROLOGY

## 2017-05-06 RX ORDER — IBUPROFEN 200 MG
1 TABLET ORAL DAILY
Status: DISCONTINUED | OUTPATIENT
Start: 2017-05-06 | End: 2017-05-08 | Stop reason: HOSPADM

## 2017-05-06 RX ADMIN — GABAPENTIN 300 MG: 300 CAPSULE ORAL at 13:29

## 2017-05-06 RX ADMIN — CLONIDINE HYDROCHLORIDE 0.1 MG: 0.1 TABLET ORAL at 08:17

## 2017-05-06 RX ADMIN — LACOSAMIDE 200 MG: 50 TABLET, FILM COATED ORAL at 20:11

## 2017-05-06 RX ADMIN — GABAPENTIN 300 MG: 300 CAPSULE ORAL at 20:11

## 2017-05-06 RX ADMIN — LACOSAMIDE 200 MG: 50 TABLET, FILM COATED ORAL at 08:18

## 2017-05-06 RX ADMIN — CLONIDINE HYDROCHLORIDE 0.1 MG: 0.1 TABLET ORAL at 14:41

## 2017-05-06 RX ADMIN — ALBUTEROL SULFATE 2.5 MG: 2.5 SOLUTION RESPIRATORY (INHALATION) at 13:01

## 2017-05-06 RX ADMIN — GABAPENTIN 300 MG: 300 CAPSULE ORAL at 08:17

## 2017-05-06 RX ADMIN — BUDESONIDE AND FORMOTEROL FUMARATE DIHYDRATE 2 PUFF: 160; 4.5 AEROSOL RESPIRATORY (INHALATION) at 08:20

## 2017-05-06 RX ADMIN — OMEGA-3-ACID ETHYL ESTERS 2000 MG: 1 CAPSULE, LIQUID FILLED ORAL at 08:17

## 2017-05-06 RX ADMIN — ZOLPIDEM TARTRATE 10 MG: 5 TABLET, FILM COATED ORAL at 20:11

## 2017-05-06 RX ADMIN — DIVALPROEX SODIUM 1250 MG: 500 TABLET, FILM COATED, EXTENDED RELEASE ORAL at 20:11

## 2017-05-06 RX ADMIN — ALBUTEROL SULFATE 2.5 MG: 2.5 SOLUTION RESPIRATORY (INHALATION) at 08:55

## 2017-05-06 RX ADMIN — CLONIDINE HYDROCHLORIDE 0.1 MG: 0.1 TABLET ORAL at 20:11

## 2017-05-06 RX ADMIN — HYDROXYZINE PAMOATE 50 MG: 50 CAPSULE ORAL at 13:29

## 2017-05-06 RX ADMIN — HYDROXYZINE PAMOATE 50 MG: 50 CAPSULE ORAL at 18:07

## 2017-05-06 RX ADMIN — DULOXETINE HYDROCHLORIDE 60 MG: 30 CAPSULE, DELAYED RELEASE ORAL at 08:17

## 2017-05-06 NOTE — PROGRESS NOTES
Problem: Suicide/Homicide (Adult/Pediatric)  Goal: *STG: Remains safe in hospital  Patient to remain safe every shift every day while hospitalized. Outcome: Progressing Towards Goal  Remains safe. Goal: *STG: Attends activities and groups  Patient to attend 2-3 group therapy every day while hospitalized. Outcome: Not Progressing Towards Goal  Been slow with attending groups. Problem: Crack/Cocaine Withdrawal  Goal: *STG: Complies with medication therapy  Patient to take medications as prescribed every shift every day while hospitalized. Outcome: Progressing Towards Goal  Med compliant. Comments:   Pt stated feeling achy and anxious, she requested and was given med for anxiety. Stated she's ready to go home, understand she's a voluntary admission. She denied hallucinations and ideations. Stated being depressed \" I don't know why \". Stated her boyfriend wanted her to come to the hospital because she was using heroin that she been using it for a long time. She didn't mention her being suicidal with a plan. Stated she wants to stop using. Been med/ diet compliant. Been back and forth in the milieu, some socializing.

## 2017-05-06 NOTE — BH NOTES
GROUP THERAPY PROGRESS NOTE    Erick Peter is participating in [place].      Group time: 30 minutes    Personal goal for participation: discuss daily Tx goal(s), discuss guideline compliance, unit issues and community announcements               Goal orientation: personal    Group therapy participation: active

## 2017-05-06 NOTE — PROGRESS NOTES
Behavioral Health Progress Note      5/6/2017    Rosi Motley    Current Diagnosis:  Axis I: MDD R/S w/o P/F vs Depression due to substance induced. Polysustance use disorder (heroine/marijuana)  Axis II: Deferred  Axis II: COPD, Cord compression, Hep C, Hyperlipidemia, spinal stenosis, Thyroid disease  Axis IV: Poor support, Chronic drugs used, Homeless, Jobless  Axis V: GAF: 30      Report from the nursing staff changes in the medical condition while patient has been on the unit:  See chart notes  Patient Vitals for the past 24 hrs:   Temp Pulse Resp BP   05/06/17 1440 - - - 119/83   05/06/17 1322 99.8 °F (37.7 °C) (!) 107 18 107/72   05/05/17 2100 98.9 °F (37.2 °C) 88 16 110/82       Recent Results (from the past 24 hour(s))   GLUCOSE, POC    Collection Time: 05/06/17  5:35 AM   Result Value Ref Range    Glucose (POC) 112 (H) 70 - 110 mg/dL       Sleep:has evidence of insomnia    Intake: Fair    Patient Comments:C/O of expecting to \" have a seizure\". Bizzare in exposing herself, seemingly unaware. Denies SI/HI, no AH/VH. Still c/o of depression. Mental Status Exam:    Appearance:shows no evidence of impairment  Behavior: shows no evidence of impairment  Motor: within normal limits  Speech: shows no evidence of impairment  Mood: anxious and depressed  Affect: anxious and depressed  Thought Process: shows no evidence of impairment  Thought Content: no evidence of impairment  Perception: None elicited.    Cognition:  appropriate decision making  Insight: The patient shows little insight  Judgment: is psychologically impaired    Medications:    Current Facility-Administered Medications   Medication Dose Route Frequency    nicotine (NICODERM CQ) 14 mg/24 hr patch 1 Patch  1 Patch TransDERmal DAILY    cloNIDine HCl (CATAPRES) tablet 0.1 mg  0.1 mg Oral TID    [START ON 5/7/2017] cloNIDine HCl (CATAPRES) tablet 0.1 mg  0.1 mg Oral BID    budesonide-formoterol (SYMBICORT) 160-4.5 mcg/actuation HFA inhaler 2 Puff  2 Puff Inhalation BID RT    DULoxetine (CYMBALTA) capsule 60 mg  60 mg Oral DAILY    gabapentin (NEURONTIN) capsule 300 mg  300 mg Oral TID    lacosamide (VIMPAT) tablet 200 mg  200 mg Oral BID    omega-3 acid ethyl esters (LOVAZA) capsule 2,000 mg  2 g Oral DAILY WITH BREAKFAST    zolpidem (AMBIEN) tablet 10 mg  10 mg Oral QHS    divalproex ER (DEPAKOTE ER) 24 hour tablet 1,250 mg  1,250 mg Oral QHS     Medications Discontinued During This Encounter   Medication Reason    albuterol (PROVENTIL HFA, VENTOLIN HFA, PROAIR HFA) inhaler 2 Puff Formulary Change    ondansetron hcl (ZOFRAN) tablet 4 mg Duplicate Order    divalproex DR (DEPAKOTE) tablet 1,000 mg     divalproex ER (DEPAKOTE ER) 24 hour tablet 1,500 mg        Treatment Plan:  Continue current treatment plan    Anticipated Discharge:   As per primary team    Rosa Cottrell MD  5/6/2017

## 2017-05-06 NOTE — BH NOTES
GROUP THERAPY PROGRESS NOTE    Aimee Costa did not participate in [place].      Group time: 15 min    Personal goal for participation: N/A    Goal orientation: community     Group therapy participation: none    Therapeutic interventions reviewed and discussed: N/A    Impression of participation: Pt did not participate

## 2017-05-06 NOTE — BH NOTES
Pt was visible in her room for the majority of the shift. Pt was compliant with q4 vital with no problems. Pt refused to attend Community Group this evening. Pt continues to endorse depression at this time. Pt states \"my body hurts\". Pt had a male visitor this evening. Pt stated she enjoyed their visit. Pt ate 100% of her dinner. Pt was compliant with all scheduled medications.

## 2017-05-07 LAB — GLUCOSE BLD STRIP.AUTO-MCNC: 115 MG/DL (ref 70–110)

## 2017-05-07 PROCEDURE — 74011250637 HC RX REV CODE- 250/637: Performed by: PSYCHIATRY & NEUROLOGY

## 2017-05-07 PROCEDURE — 82962 GLUCOSE BLOOD TEST: CPT

## 2017-05-07 PROCEDURE — 65220000003 HC RM SEMIPRIVATE PSYCH

## 2017-05-07 RX ORDER — ACETAMINOPHEN 325 MG/1
650 TABLET ORAL
Status: DISCONTINUED | OUTPATIENT
Start: 2017-05-07 | End: 2017-05-08 | Stop reason: HOSPADM

## 2017-05-07 RX ORDER — ALBUTEROL SULFATE 90 UG/1
2 AEROSOL, METERED RESPIRATORY (INHALATION)
Status: DISCONTINUED | OUTPATIENT
Start: 2017-05-07 | End: 2017-05-08 | Stop reason: HOSPADM

## 2017-05-07 RX ADMIN — LACOSAMIDE 200 MG: 50 TABLET, FILM COATED ORAL at 08:19

## 2017-05-07 RX ADMIN — GABAPENTIN 300 MG: 300 CAPSULE ORAL at 08:20

## 2017-05-07 RX ADMIN — GABAPENTIN 300 MG: 300 CAPSULE ORAL at 20:20

## 2017-05-07 RX ADMIN — CLONIDINE HYDROCHLORIDE 0.1 MG: 0.1 TABLET ORAL at 08:20

## 2017-05-07 RX ADMIN — HYDROXYZINE PAMOATE 50 MG: 50 CAPSULE ORAL at 10:41

## 2017-05-07 RX ADMIN — DULOXETINE HYDROCHLORIDE 60 MG: 30 CAPSULE, DELAYED RELEASE ORAL at 08:20

## 2017-05-07 RX ADMIN — BUDESONIDE AND FORMOTEROL FUMARATE DIHYDRATE 2 PUFF: 160; 4.5 AEROSOL RESPIRATORY (INHALATION) at 20:22

## 2017-05-07 RX ADMIN — DIVALPROEX SODIUM 1250 MG: 500 TABLET, FILM COATED, EXTENDED RELEASE ORAL at 20:20

## 2017-05-07 RX ADMIN — OMEGA-3-ACID ETHYL ESTERS 2000 MG: 1 CAPSULE, LIQUID FILLED ORAL at 08:19

## 2017-05-07 RX ADMIN — TRAZODONE HYDROCHLORIDE 50 MG: 50 TABLET ORAL at 20:20

## 2017-05-07 RX ADMIN — BUDESONIDE AND FORMOTEROL FUMARATE DIHYDRATE 2 PUFF: 160; 4.5 AEROSOL RESPIRATORY (INHALATION) at 08:21

## 2017-05-07 RX ADMIN — HYDROXYZINE PAMOATE 50 MG: 50 CAPSULE ORAL at 15:29

## 2017-05-07 RX ADMIN — ACETAMINOPHEN 650 MG: 325 TABLET ORAL at 18:12

## 2017-05-07 RX ADMIN — CLONIDINE HYDROCHLORIDE 0.1 MG: 0.1 TABLET ORAL at 20:20

## 2017-05-07 RX ADMIN — LACOSAMIDE 200 MG: 50 TABLET, FILM COATED ORAL at 20:20

## 2017-05-07 RX ADMIN — HYDROXYZINE PAMOATE 50 MG: 50 CAPSULE ORAL at 06:46

## 2017-05-07 RX ADMIN — ZOLPIDEM TARTRATE 10 MG: 5 TABLET, FILM COATED ORAL at 20:20

## 2017-05-07 RX ADMIN — ALBUTEROL SULFATE 2 PUFF: 90 AEROSOL, METERED RESPIRATORY (INHALATION) at 17:30

## 2017-05-07 RX ADMIN — GABAPENTIN 300 MG: 300 CAPSULE ORAL at 13:54

## 2017-05-07 NOTE — BH NOTES
GROUP THERAPY PROGRESS NOTE    Carmelian Sen is participating in Recreational Therapy. Group time: 45 minutes    Personal goal for participation: To aid in her recovery    Goal orientation: relaxation    Group therapy participation: active    Therapeutic interventions reviewed and discussed: goals and     Impression of participation: Patient was an outstanding contribution to group.  Patient contributed to all participants overall experience and worked towards her personal goals

## 2017-05-07 NOTE — PROGRESS NOTES
Behavioral Health Progress Note      5/7/2017    Angelito Cool    Current Diagnosis:  Axis I: MDD R/S w/o P/F vs Depression due to substance induced. Polysustance use disorder (heroine/marijuana)  Axis II: Deferred  Axis II: COPD, Cord compression, Hep C, Hyperlipidemia, spinal stenosis, Thyroid disease  Axis IV: Poor support, Chronic drugs used, Homeless, Jobless  Axis V: GAF: 45      Report from the nursing staff changes in the medical condition while patient has been on the unit:  See chart notes  Patient Vitals for the past 24 hrs:   Temp Pulse Resp BP   05/07/17 0745 98.4 °F (36.9 °C) 97 18 128/88       Recent Results (from the past 24 hour(s))   GLUCOSE, POC    Collection Time: 05/07/17  6:32 AM   Result Value Ref Range    Glucose (POC) 115 (H) 70 - 110 mg/dL       Sleep:has evidence of insomnia    Intake: Fair    Patient Comments:C/O of feeling frustrated, and \" antsy\", irritable. Asks for medication, informed that she has 3 orders of Vistaril that she can ask for as needed. Said she had poor sleep last night even on 10 mg Ambien (FDA recommendations for females is 5 mg). Denies SI/HI, no AH/VH. Asking to go home    Mental Status Exam:    Appearance:shows no evidence of impairment  Behavior: shows no evidence of impairment  Motor: within normal limits  Speech: shows no evidence of impairment  Mood: anxious and depressed  Affect: anxious and depressed  Thought Process: shows no evidence of impairment  Thought Content: no evidence of impairment  Perception: None elicited.    Cognition:  appropriate decision making  Insight: The patient shows little insight  Judgment: is psychologically impaired    Medications:    Current Facility-Administered Medications   Medication Dose Route Frequency    nicotine (NICODERM CQ) 14 mg/24 hr patch 1 Patch  1 Patch TransDERmal DAILY    cloNIDine HCl (CATAPRES) tablet 0.1 mg  0.1 mg Oral BID    budesonide-formoterol (SYMBICORT) 160-4.5 mcg/actuation HFA inhaler 2 Puff  2 Puff Inhalation BID RT    DULoxetine (CYMBALTA) capsule 60 mg  60 mg Oral DAILY    gabapentin (NEURONTIN) capsule 300 mg  300 mg Oral TID    lacosamide (VIMPAT) tablet 200 mg  200 mg Oral BID    omega-3 acid ethyl esters (LOVAZA) capsule 2,000 mg  2 g Oral DAILY WITH BREAKFAST    zolpidem (AMBIEN) tablet 10 mg  10 mg Oral QHS    divalproex ER (DEPAKOTE ER) 24 hour tablet 1,250 mg  1,250 mg Oral QHS     Medications Discontinued During This Encounter   Medication Reason    albuterol (PROVENTIL HFA, VENTOLIN HFA, PROAIR HFA) inhaler 2 Puff Formulary Change    ondansetron hcl (ZOFRAN) tablet 4 mg Duplicate Order    divalproex DR (DEPAKOTE) tablet 1,000 mg     divalproex ER (DEPAKOTE ER) 24 hour tablet 1,500 mg        Treatment Plan:  Continue current treatment plan; add Melatonin 9 mg po HS for insomnia. Also discussed with her that she can use Vistaril if needed for anxiety. Anticipated Discharge:   As per primary team    Mendel Roch, MD  5/7/2017

## 2017-05-07 NOTE — BH NOTES
Patient participated in group activities, visited with boyfriend in the evening. Patient napped in the evening, social with peers. No concerns voiced to this writer. Will continue to monitor per safety precautions.

## 2017-05-07 NOTE — PROGRESS NOTES
Problem: Suicide/Homicide (Adult/Pediatric)  Goal: *STG: Remains safe in hospital  Patient to remain safe every shift every day while hospitalized. Outcome: Progressing Towards Goal  Remains safe. Problem: Crack/Cocaine Withdrawal  Goal: *STG: Complies with medication therapy  Patient to take medications as prescribed every shift every day while hospitalized. Outcome: Progressing Towards Goal  Med compliant. Problem: Falls - Risk of  Goal: *Absence of falls  Outcome: Progressing Towards Goal  No falls. Comments:   Pt been out in the milieu, talking and socializing with peers. Stated feeling \" antsy \", requesting med earlier for anxiety. Denies hallucinations and ideations but feeling depressed. still saying she don't know why she's feeling depressed. Remains med/ diet compliant. Behavior been cooperative.

## 2017-05-07 NOTE — BH NOTES
GROUP THERAPY PROGRESS NOTE    Oren Roberts is participating in [place].      Group time: 30 minutes    Personal goal for participation: verify insight and reality orientation, discuss unit issues and guideline compliance    Goal orientation: personal    Group therapy participation: active

## 2017-05-08 VITALS
HEIGHT: 61 IN | BODY MASS INDEX: 20.77 KG/M2 | SYSTOLIC BLOOD PRESSURE: 122 MMHG | WEIGHT: 110 LBS | RESPIRATION RATE: 15 BRPM | DIASTOLIC BLOOD PRESSURE: 88 MMHG | OXYGEN SATURATION: 98 % | HEART RATE: 96 BPM | TEMPERATURE: 97.3 F

## 2017-05-08 LAB — GLUCOSE BLD STRIP.AUTO-MCNC: 101 MG/DL (ref 70–110)

## 2017-05-08 PROCEDURE — 74011250637 HC RX REV CODE- 250/637: Performed by: PSYCHIATRY & NEUROLOGY

## 2017-05-08 PROCEDURE — 74011250637 HC RX REV CODE- 250/637: Performed by: EMERGENCY MEDICINE

## 2017-05-08 PROCEDURE — 82962 GLUCOSE BLOOD TEST: CPT

## 2017-05-08 RX ORDER — BUDESONIDE AND FORMOTEROL FUMARATE DIHYDRATE 160; 4.5 UG/1; UG/1
2 AEROSOL RESPIRATORY (INHALATION) 2 TIMES DAILY
Qty: 1 INHALER | Refills: 0 | Status: SHIPPED | OUTPATIENT
Start: 2017-05-08 | End: 2017-07-19 | Stop reason: SDUPTHER

## 2017-05-08 RX ORDER — GABAPENTIN 300 MG/1
300 CAPSULE ORAL 3 TIMES DAILY
Qty: 90 CAP | Refills: 0 | Status: SHIPPED | OUTPATIENT
Start: 2017-05-08 | End: 2017-06-07

## 2017-05-08 RX ORDER — ALBUTEROL SULFATE 90 UG/1
2 AEROSOL, METERED RESPIRATORY (INHALATION)
Qty: 1 INHALER | Refills: 0 | Status: SHIPPED | OUTPATIENT
Start: 2017-05-08 | End: 2017-06-07

## 2017-05-08 RX ORDER — IBUPROFEN 600 MG/1
600 TABLET ORAL
Status: DISCONTINUED | OUTPATIENT
Start: 2017-05-08 | End: 2017-05-08 | Stop reason: HOSPADM

## 2017-05-08 RX ORDER — DULOXETIN HYDROCHLORIDE 60 MG/1
60 CAPSULE, DELAYED RELEASE ORAL DAILY
Qty: 30 CAP | Refills: 0 | Status: SHIPPED | OUTPATIENT
Start: 2017-05-08 | End: 2017-06-07

## 2017-05-08 RX ORDER — DIVALPROEX SODIUM 250 MG/1
1250 TABLET, EXTENDED RELEASE ORAL
Qty: 150 TAB | Refills: 0 | Status: SHIPPED | OUTPATIENT
Start: 2017-05-08 | End: 2017-06-07

## 2017-05-08 RX ORDER — LACOSAMIDE 200 MG/1
200 TABLET ORAL 2 TIMES DAILY
Qty: 60 TAB | Refills: 0 | Status: SHIPPED | OUTPATIENT
Start: 2017-05-08 | End: 2017-06-07

## 2017-05-08 RX ORDER — ZOLPIDEM TARTRATE 10 MG/1
10 TABLET ORAL
Qty: 30 TAB | Refills: 0 | Status: SHIPPED | OUTPATIENT
Start: 2017-05-08 | End: 2017-06-07

## 2017-05-08 RX ADMIN — ALBUTEROL SULFATE 2 PUFF: 90 AEROSOL, METERED RESPIRATORY (INHALATION) at 08:26

## 2017-05-08 RX ADMIN — GABAPENTIN 300 MG: 300 CAPSULE ORAL at 08:23

## 2017-05-08 RX ADMIN — LACOSAMIDE 200 MG: 50 TABLET, FILM COATED ORAL at 08:22

## 2017-05-08 RX ADMIN — BUDESONIDE AND FORMOTEROL FUMARATE DIHYDRATE 2 PUFF: 160; 4.5 AEROSOL RESPIRATORY (INHALATION) at 08:26

## 2017-05-08 RX ADMIN — HYDROXYZINE PAMOATE 25 MG: 25 CAPSULE ORAL at 12:04

## 2017-05-08 RX ADMIN — DULOXETINE HYDROCHLORIDE 60 MG: 30 CAPSULE, DELAYED RELEASE ORAL at 08:23

## 2017-05-08 RX ADMIN — GABAPENTIN 300 MG: 300 CAPSULE ORAL at 14:39

## 2017-05-08 RX ADMIN — OMEGA-3-ACID ETHYL ESTERS 2000 MG: 1 CAPSULE, LIQUID FILLED ORAL at 08:22

## 2017-05-08 RX ADMIN — HYDROXYZINE PAMOATE 50 MG: 50 CAPSULE ORAL at 08:29

## 2017-05-08 RX ADMIN — IBUPROFEN 600 MG: 600 TABLET, FILM COATED ORAL at 06:50

## 2017-05-08 NOTE — DISCHARGE INSTRUCTIONS
BEHAVIORAL HEALTH NURSING DISCHARGE NOTE      The following personal items collected during your admission are returned to you:   Dental Appliance: Dental Appliances: Partials, With patient  Vision:    Hearing Aid:    Jewelry: Jewelry: None  Clothing: Clothing: Belt, Footwear, Pants, Shirt, Undergarments, With patient  Other Valuables: Other Valuables: None  Valuables sent to safe:        PATIENT INSTRUCTIONS:      Regular diet      The discharge information has been reviewed with the patient. The patient verbalized understanding.       Patient armband removed and shredded

## 2017-05-08 NOTE — BH NOTES
Pt was very sociable with peers and staff throughout the shift. Pt enjoyed a visit from her family and was looking forward to going home soon. Staff encourage Pt to stay focus and be patient. Staff continue to monitor for safety and well being.

## 2017-05-08 NOTE — BH NOTES
Patient requested pain medication for Left shoulder and neck pain patient given Motrin for pain will continue to monitor.

## 2017-05-08 NOTE — BH NOTES
GROUP THERAPY PROGRESS NOTE    Pedro Glass is participating in Le[employer] Group. Group time: 45 minutes    Personal goal for participation: socialization and personal betterment    Goal orientation: relaxation    Group therapy participation: active    Therapeutic interventions reviewed and discussed: goals and mentoring    Impression of participation: Patient has been a completely different person than patient's first day. Patient has shown real desire for sobriety and stability. Patient has taken to encouraging positive health in other patients.

## 2017-05-08 NOTE — BH NOTES
GROUP THERAPY PROGRESS NOTE    Erick Peter is participating in [place].      Group time: 15 minutes    Personal goal for participation: talk w/sw    Goal orientation:community    Group therapy participation: active    Therapeutic interventions reviewed and discussed: rules and personal goals

## 2017-05-08 NOTE — DISCHARGE SUMMARY
[facility]  Discharge Summary     Patient ID:  Ngoc Bentley  [id number]  33 y.o.  1965    Admit date: 2017    Discharge date and time: 2017 and morning. Admission Diagnoses: Depression with suicidal ideation    Discharge Diagnoses: MDD R/S w/o P/F. Depression due to substance induced and Polysustance use disorder (heroine/marijuana)    Disposition: home    Discharged Condition: good    Past Medical History:   Diagnosis Date    Chronic obstructive pulmonary disease ([facility] [address] 2016    severe. PFT confirmed. 2016    COPD     Cord compression [facility])     Dr Shelby Gates Current smoker 2016    Depression     Hepatitis C antibody test positive 2015    Negative viral load, Immune    History of cocaine abuse     History of heroin abuse     Hypercholesterolemia     Other diseases of lung, not elsewhere classified     pneumonia    Seizures ([facility] [address]     complex partial, unk etiology; Dr. Stephania Lira. Nettie Sanches, Neurology-    Spinal stenosis     Thyroid disease     many years per pt    Tobacco use       Family History   Problem Relation Age of Onset    Cancer Father 48     lung    Lung Disease Father     Cancer Maternal Grandmother      lung    Lung Disease Maternal Grandmother       Social History   Substance Use Topics    Smoking status: Current Every Day Smoker     Packs/day: 0.25     Years: 35.00     Types: Cigarettes    Smokeless tobacco: Never Used    Alcohol use No     Past Surgical History:   Procedure Laterality Date    HX CERVICAL DISKECTOMY      HX  SECTION      HX GYN            HX TUBAL LIGATION        Prior to Admission medications    Medication Sig Start Date End Date Taking? Authorizing Provider   divalproex ER (DEPAKOTE ER) 500 mg ER tablet Take 2 tabs in the morning and 2 tabs in the evening for seizures. 17   Eulalio Santoro NP   omega-3 acid ethyl esters (LOVAZA) 1 gram capsule Take 2 Caps by mouth daily (with breakfast).  Indications: HYPERTRIGLYCERIDEMIA 7/25/16   Lani Manrique NP   budesonide-formoterol (SYMBICORT) 160-4.5 mcg/actuation HFA inhaler Take 2 Puffs by inhalation two (2) times a day. 7/25/16   Lani Manrique NP   lacosamide (VIMPAT) 200 mg tab tablet Take 1 Tab by mouth two (2) times a day. Indications: PARTIAL EPILEPSY TREATMENT ADJUNCT 7/25/16   Cheri Leo Sharif NP   DULoxetine (CYMBALTA) 60 mg capsule Take 1 Cap by mouth daily. 7/25/16   Lani Manrique NP   albuterol (VENTOLIN HFA) 90 mcg/actuation inhaler Take 2 Puffs by inhalation every four (4) hours as needed for Wheezing or Shortness of Breath (Cough). Indications: CHRONIC OBSTRUCTIVE PULMONARY DISEASE 7/25/16   Lani Manrique NP     Allergies   Allergen Reactions    Ampicillin Anaphylaxis    Penicillin G Anaphylaxis    Adhesive Tape-Silicones Rash    Tegretol [Carbamazepine] Rash      Hospital course: The patient was admitted to the special treatment unit on 5/5/2017. The patient was engaged in individual and group therapies, and occupational therapy. The patient was involved in chemical dependence educational classes and NA/AA. During hospitalization patient posed NO management problems. Patient was compliant w/ meds and w/ meals w/o any SE reported or observed. Patient also attended psychotherapy group sessions. Recently admitted under TDO status for depression and heroin w/d detox at [facility] on 3/16/2017 and was discharged on 3/20/2017 once the patient heroin w/d symptoms improved, mood and anxiety stabilized. Unlike last admission patient brought self to the [facility] ED and requests admission under volunteer status for depression, SI, and heroin detox. At present, patient voiced that she desperately needs help to stop using drug and stay sober. Patient reports having opioid w/d symptoms and would like to continue detox. She denied manic-depressive symptoms just depressive symptoms.  Patient was started on Clonidine taper protocol for 3 days and supportive measures for Heroin w/d symptoms. Patient was also restarted on ADM, Mood stabilizer, and Sleeping aide to improve her psychiatric symptom and mood instability. Patient reports that the current treatment regimen is effective at controlling his psychiatric symptoms. At the time of discharge, the patient denied homicidal or suicidal ideation. Patient was not psychotic and was capable of self-care and competent to make their own financial and medical decisions. The patient has an understanding of treatment recommendations and medication management on discharge. Discharge Exams:   Mental Status exam: WNL   Physical exam: No exam performed today, NO physical complaints reported. Chest X-Ray: None  ECG: None    Lab/Data Review: All lab results for the last 24 hours reviewed. Consultations (including impressions and outcomes): None necessary  Psychiatric Testing: Reality based  Treatment and Response: Effective  Significant adverse reaction to drugs: none  Procedures/Operations: none  Aftercare safety treatment plan was discussed with the patient before discharge. Discharge medications:  -1. Continue: Cymbalta 60 mg PO qAM  -2. Continue: Gabapentin 300 mg PO TID  -3. Continue: Depakote ER 1250 mg PO qHS   - Patient doesn't want any Lab work (depakote level before discharge)  - NO si/x of depakote toxicities observed or reported. -4. Continue: Ambien 10 mg PO qHS  -5. Symbicort 160-4.5 mcg/actuation HFA inhaler 2 puff BID  -6. Vimpat 200 mg PO BID    Activity: Activity as tolerated  Diet: Regular Diet    - Patient requests a referral to a Methadone clinic or to an outpatient provider with suboxone certification.   - All f/u were arranged for the patient by the discharge planner at the time of discharge.      Signed:  Sage Jacobson MD  5/8/2017  10:42 AM

## 2017-05-08 NOTE — BH NOTES
Discharge instructions explained, copy given to pt. Discharged at [phone] with her belongings, stated a friend is picking her up, she's waiting in the lobby.

## 2017-06-14 ENCOUNTER — TELEPHONE (OUTPATIENT)
Dept: FAMILY MEDICINE CLINIC | Age: 52
End: 2017-06-14

## 2017-06-15 ENCOUNTER — DOCUMENTATION ONLY (OUTPATIENT)
Dept: FAMILY MEDICINE CLINIC | Age: 52
End: 2017-06-15

## 2017-06-15 NOTE — TELEPHONE ENCOUNTER
Medication: Lovaza 1gm, dose: 2 caps, how often: qd, current number of medication days provided: 90, refill per application. Lot #: [id number], EXP 06/2018. This medication was received and verified for the following 1. Correct Patient, 2. Correct Diagnosis, 3. Correct Drug, 4. Correct route, and no current allergy to medication. Medication: Ventolin HFA, dose: 2 puffs, how often: every 4 hours as needed, current number of medication days provided: 90, refill per application. Lot #: [id number], EXP 06/2018. This medication was received and verified for the following 1. Correct Patient, 2. Correct Diagnosis, 3. Correct Drug, 4. Correct route, and no current allergy to medication. Please contact patient to come  their medications.      Rick Maharaj, MSN,RN,[facility]     [facility]

## 2017-06-28 ENCOUNTER — HOSPITAL ENCOUNTER (OUTPATIENT)
Dept: MAMMOGRAPHY | Age: 52
Discharge: HOME | End: 2017-06-28

## 2017-06-28 ENCOUNTER — HOSPITAL ENCOUNTER (OUTPATIENT)
Dept: ULTRASOUND IMAGING | Age: 52
Discharge: HOME | End: 2017-06-28

## 2017-06-28 DIAGNOSIS — R92.8 ABNORMAL MAMMOGRAM: ICD-10-CM

## 2017-06-28 PROCEDURE — 77066 DX MAMMO INCL CAD BI: CPT

## 2017-07-05 ENCOUNTER — HOSPITAL ENCOUNTER (OUTPATIENT)
Dept: ULTRASOUND IMAGING | Age: 52
Discharge: HOME | End: 2017-07-05
Payer: MEDICAID

## 2017-07-05 PROCEDURE — 76642 ULTRASOUND BREAST LIMITED: CPT

## 2017-07-19 ENCOUNTER — DOCUMENTATION ONLY (OUTPATIENT)
Dept: FAMILY MEDICINE CLINIC | Age: 52
End: 2017-07-19

## 2017-07-19 DIAGNOSIS — J44.9 CHRONIC OBSTRUCTIVE PULMONARY DISEASE, UNSPECIFIED COPD TYPE (HCC): ICD-10-CM

## 2017-07-19 DIAGNOSIS — E78.1 HYPERTRIGLYCERIDEMIA: ICD-10-CM

## 2017-07-19 DIAGNOSIS — G62.9 NEUROPATHY: Primary | ICD-10-CM

## 2017-07-19 DIAGNOSIS — R56.9 CONVULSIONS, UNSPECIFIED CONVULSION TYPE (HCC): ICD-10-CM

## 2017-07-19 RX ORDER — OMEGA-3-ACID ETHYL ESTERS 1 G/1
2 CAPSULE, LIQUID FILLED ORAL
Qty: 60 CAP | Refills: 3 | Status: SHIPPED | OUTPATIENT
Start: 2017-07-19

## 2017-07-19 RX ORDER — DULOXETIN HYDROCHLORIDE 60 MG/1
60 CAPSULE, DELAYED RELEASE ORAL DAILY
Qty: 30 CAP | Refills: 3 | Status: SHIPPED | OUTPATIENT
Start: 2017-07-19

## 2017-07-19 RX ORDER — BUPRENORPHINE HYDROCHLORIDE AND NALOXONE HYDROCHLORIDE DIHYDRATE 8; 2 MG/1; MG/1
TABLET SUBLINGUAL
Refills: 0 | COMMUNITY
Start: 2017-07-11

## 2017-07-19 RX ORDER — ALBUTEROL SULFATE 90 UG/1
2 AEROSOL, METERED RESPIRATORY (INHALATION)
Qty: 1 INHALER | Refills: 3 | Status: SHIPPED | OUTPATIENT
Start: 2017-07-19

## 2017-08-19 ENCOUNTER — HOSPITAL ENCOUNTER (EMERGENCY)
Age: 52
Discharge: HOME | End: 2017-08-19
Attending: EMERGENCY MEDICINE | Admitting: EMERGENCY MEDICINE
Payer: MEDICAID

## 2017-08-19 VITALS
OXYGEN SATURATION: 98 % | SYSTOLIC BLOOD PRESSURE: 113 MMHG | DIASTOLIC BLOOD PRESSURE: 84 MMHG | HEART RATE: 96 BPM | TEMPERATURE: 97.9 F | RESPIRATION RATE: 12 BRPM

## 2017-08-19 DIAGNOSIS — T40.1X1A HEROIN OVERDOSE, ACCIDENTAL OR UNINTENTIONAL, INITIAL ENCOUNTER (HCC): ICD-10-CM

## 2017-08-19 DIAGNOSIS — F11.10 HEROIN ABUSE (HCC): Primary | ICD-10-CM

## 2017-08-19 PROCEDURE — 99284 EMERGENCY DEPT VISIT MOD MDM: CPT

## 2017-08-19 NOTE — ED TRIAGE NOTES
PT brought in via EMS, EMS reports patient was apneic and cyanotic upon arrival with 1 pill of heroin in right nare, EMS suctioned right nare and administered 2mg Narcan nasal brought back respiratory drive and then administered 0.5mg IV narcan woke her up,

## 2017-08-19 NOTE — ED PROVIDER NOTES
HPI Comments: 6:31 PM Dionte Simental is a 46 y.o. female w/ hx of COPD, depression, thyroid, hep-C, and seizures who presents to the ED, via EMS, for evaluation post heroin overdose. Per EMS, pt was found unresponsive in an 8 inch ice bath at her boyfriend's house. Pt was initially given 2 intranasal Narcan that revived her breathing. Pt was given .5 IV Narcan when pt became alert. Pt admits to snorting heroin this afternoon. Pt states that she has been clean for years due to Suboxone but notes that she was unable to get the medication this month because she did not have the income. Pt denies SI, decreased appetite, and being on pain management. Pt has no other sx or complaints. Past Medical History:   Diagnosis Date    Chronic obstructive pulmonary disease ([facility] [address] 2016    severe. PFT confirmed. 2016    COPD     Cord compression [facility])     Dr Suad Ch Current smoker 2016    Depression     Hepatitis C antibody test positive 2015    Negative viral load, Immune    History of cocaine abuse     History of heroin abuse     Hypercholesterolemia     Mass of left breast on mammogram 2017    stable mass at 12:00 position. Recommend yearly mammo    Other diseases of lung, not elsewhere classified     pneumonia    Seizures ([facility] [address] 2007    complex partial, unk etiology; Dr. Og Simental.  Stephany Gravely, Neurology-    Spinal stenosis     Thyroid disease     many years per pt    Tobacco use        Past Surgical History:   Procedure Laterality Date    HX CERVICAL DISKECTOMY      HX  SECTION      HX GYN            HX TUBAL LIGATION           Family History:   Problem Relation Age of Onset    Cancer Father 48     lung    Lung Disease Father     Cancer Maternal Grandmother      lung    Lung Disease Maternal Grandmother        Social History     Social History    Marital status: LEGALLY      Spouse name: N/A    Number of children: 2    Years of education: 10     Occupational History    Not on file. Social History Main Topics    Smoking status: Current Every Day Smoker     Packs/day: 0.25     Years: 35.00     Types: Cigarettes    Smokeless tobacco: Never Used    Alcohol use No    Drug use: 7.00 per week     Special: Heroin, Marijuana      Comment:  states it has been awhile since she did Heroin.  Sexual activity: Yes     Partners: Male     Birth control/ protection: Condom     Other Topics Concern     Service No    Blood Transfusions No    Caffeine Concern No    Occupational Exposure No    Hobby Hazards No    Sleep Concern No    Stress Concern No    Weight Concern No    Special Diet No    Back Care No    Exercise No    Bike Helmet No    Seat Belt Yes    Self-Exams No     Social History Narrative         ALLERGIES: Ampicillin; Penicillin g; Adhesive tape-silicones; and Tegretol [carbamazepine]    Review of Systems   Constitutional: Negative for activity change, fatigue and fever. Heroin overdose    HENT: Negative for congestion and rhinorrhea. Eyes: Negative for visual disturbance. Respiratory: Negative for shortness of breath. Cardiovascular: Negative for chest pain and palpitations. Gastrointestinal: Negative for abdominal pain, diarrhea, nausea and vomiting. Genitourinary: Negative for dysuria and hematuria. Musculoskeletal: Negative for back pain. Skin: Negative for rash. Neurological: Negative for dizziness, weakness and light-headedness. All other systems reviewed and are negative. Vitals:    08/19/17 1827 08/19/17 1830 08/19/17 1845   BP: 131/79 115/82 113/88   Pulse: (!) 105 97 96   Resp: 14 17 15   Temp: 97.9 °F (36.6 °C)     SpO2: 96% 98% 97%            Physical Exam   Constitutional: She is oriented to person, place, and time. She appears well-developed and well-nourished. No distress. Thin    HENT:   Head: Normocephalic and atraumatic.    Right Ear: External ear normal.   Left Ear: External ear normal. Mouth/Throat: Oropharynx is clear and moist. No oropharyngeal exudate. R nares with erythema, no drainage    Eyes: Conjunctivae and EOM are normal. Pupils are equal, round, and reactive to light. Right eye exhibits no discharge. Left eye exhibits no discharge. No scleral icterus. Neck: Normal range of motion. Neck supple. No JVD present. No tracheal deviation present. No thyromegaly present. Cardiovascular: Normal rate, regular rhythm, normal heart sounds and intact distal pulses. Exam reveals no gallop and no friction rub. No murmur heard. Pulmonary/Chest: Effort normal and breath sounds normal. No stridor. No respiratory distress. She has no wheezes. She has no rales. She exhibits no tenderness. Abdominal: Soft. Bowel sounds are normal. She exhibits no distension and no mass. There is no tenderness. There is no rebound and no guarding. Genitourinary: Vagina normal and uterus normal. No vaginal discharge found. Musculoskeletal: Normal range of motion. She exhibits no edema or tenderness. Lymphadenopathy:     She has no cervical adenopathy. Neurological: She is alert and oriented to person, place, and time. She has normal reflexes. No cranial nerve deficit. Skin: Skin is warm and dry. No rash noted. She is not diaphoretic. No erythema. No pallor. Psychiatric:   Denies SI or HI   Nursing note and vitals reviewed. MDM  Number of Diagnoses or Management Options  Diagnosis management comments: Pt is a 47yo female with a hx of polysubstance abuse presents after an unintentional overdose after snorting a cap of heroin. Pt was given IN and IV narcan with reversal.  Pt has no complaint now and as expected wants to leave. Will encourage her to stay to observe her for recurrent apnea.   Jenniffer Bergman DO 7:10 PM      ED Course       Procedures        Vitals:  Patient Vitals for the past 12 hrs:   Temp Pulse Resp BP SpO2   08/19/17 1845 - 96 15 113/88 97 %   08/19/17 1830 - 97 17 115/82 98 %   08/19/17 1827 97.9 °F (36.6 °C) (!) 105 14 131/79 96 %       Medications ordered:   Medications - No data to display      Lab findings:  No results found for this or any previous visit (from the past 12 hour(s)). Progress notes, Consult notes or additional Procedure notes:     Reevaluation of patient:     Pt was observed for over an hour and then insisted on leaving. I encouraged her to stay for at least another hour but she refused. We discussed ways to mitigate her risk with her addiction and gave her a narcan rx. Pt informed me she would like to use her money to get back on Suboxone. I will discharge her as she is oriented x 4, normal gait, without SI, and will not stay any longer. Brittany Delgadillo,  10:05 AM    7:25 PM I have reassessed the patient and discussed their results and diagnosis. Pt will be discharged in stable condition. Patient is to return to emergency department if any new or worsening condition. Patient understands and verbalizes agreement with plan. Disposition:  Diagnosis:   1. Heroin abuse    2. Heroin overdose, accidental or unintentional, initial encounter        Disposition: Discharge    Follow-up Information     Follow up With Details Comments [address], NP Schedule an appointment as soon as possible for a visit in 2 days for re-evaluation and further treatment [address] [place] [address] Call in 2 days for re-evaluation and further treatment [facility] [id number] [phone] [address] [facility] EMERGENCY DEPT  As needed, If symptoms worsen [address]  [phone]           Patient's Medications   Start Taking    No medications on file   Continue Taking    ALBUTEROL (VENTOLIN HFA) 90 MCG/ACTUATION INHALER    Take 2 Puffs by inhalation every four (4) hours as needed (Cough).  Indications: Chronic Obstructive Pulmonary Disease    BUPRENORPHINE-NALOXONE 8-2 MG SUBL    DISSOLVE 1 T PO BID    DULOXETINE (CYMBALTA) 60 MG CAPSULE    Take 1 Cap by mouth daily. For nerve pain    LACOSAMIDE (VIMPAT) 200 MG TAB TABLET    Take 1 Tab by mouth two (2) times a day. Indications: PARTIAL EPILEPSY TREATMENT ADJUNCT    OMEGA-3 ACID ETHYL ESTERS (LOVAZA) 1 GRAM CAPSULE    Take 2 Caps by mouth daily (with breakfast). Indications: hypertriglyceridemia   These Medications have changed    No medications on file   Stop Taking    No medications on file         Scribe Attestation:   I, Rohan Story, am scribing for and in the presence of Mario Parisi MD on this day 08/19/17 at 6:31 PM   wayne Wilkinson    Provider Attestation:  I personally performed the services described in the documentation, reviewed the documentation, as recorded by the scribe in my presence, and it accurately and completely records my words and actions.   Mario Parisi MD. 6:31 PM      Signed by: Wayne Wilkinson, 6:31 PM

## 2017-08-19 NOTE — ED NOTES
I have reviewed discharge instructions with the patient. The patient verbalized understanding.  Pt discharged with prescription for narcan

## 2017-08-19 NOTE — ED NOTES
Pt stated she was ready to leave and that \"she was good\". Dr Nisa Beck notified.  Pt updated that she needs to stay for 30 more minutes at least and food provided per Dr Nisa Beck

## 2017-10-21 ENCOUNTER — HOSPITAL ENCOUNTER (EMERGENCY)
Age: 52
Discharge: HOME | End: 2017-10-22
Attending: EMERGENCY MEDICINE | Admitting: EMERGENCY MEDICINE
Payer: MEDICAID

## 2017-10-21 VITALS
DIASTOLIC BLOOD PRESSURE: 72 MMHG | HEIGHT: 61 IN | HEART RATE: 97 BPM | SYSTOLIC BLOOD PRESSURE: 123 MMHG | TEMPERATURE: 99.9 F | WEIGHT: 110 LBS | OXYGEN SATURATION: 99 % | RESPIRATION RATE: 18 BRPM | BODY MASS INDEX: 20.77 KG/M2

## 2017-10-21 DIAGNOSIS — L02.419 CELLULITIS AND ABSCESS OF LEG, EXCEPT FOOT: Primary | ICD-10-CM

## 2017-10-21 DIAGNOSIS — L03.119 CELLULITIS AND ABSCESS OF LEG, EXCEPT FOOT: Primary | ICD-10-CM

## 2017-10-21 PROCEDURE — 99281 EMR DPT VST MAYX REQ PHY/QHP: CPT

## 2017-10-21 PROCEDURE — 75810000289 HC I&D ABSCESS SIMP/COMP/MULT

## 2017-10-21 PROCEDURE — 74011000250 HC RX REV CODE- 250: Performed by: NURSE PRACTITIONER

## 2017-10-21 PROCEDURE — 77030019895 HC PCKNG STRP IODO -A

## 2017-10-21 PROCEDURE — 74011250637 HC RX REV CODE- 250/637: Performed by: NURSE PRACTITIONER

## 2017-10-21 RX ORDER — KETOROLAC TROMETHAMINE 30 MG/ML
30 INJECTION, SOLUTION INTRAMUSCULAR; INTRAVENOUS
Status: DISCONTINUED | OUTPATIENT
Start: 2017-10-21 | End: 2017-10-21 | Stop reason: ALTCHOICE

## 2017-10-21 RX ORDER — CLINDAMYCIN HYDROCHLORIDE 300 MG/1
300 CAPSULE ORAL 4 TIMES DAILY
Qty: 40 CAP | Refills: 0 | Status: SHIPPED | OUTPATIENT
Start: 2017-10-21 | End: 2017-10-31

## 2017-10-21 RX ORDER — IBUPROFEN 400 MG/1
800 TABLET ORAL
Status: COMPLETED | OUTPATIENT
Start: 2017-10-21 | End: 2017-10-21

## 2017-10-21 RX ORDER — IBUPROFEN 800 MG/1
800 TABLET ORAL
Qty: 20 TAB | Refills: 0 | Status: SHIPPED | OUTPATIENT
Start: 2017-10-21 | End: 2017-10-28

## 2017-10-21 RX ORDER — CLINDAMYCIN HYDROCHLORIDE 150 MG/1
300 CAPSULE ORAL
Status: COMPLETED | OUTPATIENT
Start: 2017-10-21 | End: 2017-10-21

## 2017-10-21 RX ADMIN — Medication 2 ML: at 21:27

## 2017-10-21 RX ADMIN — IBUPROFEN 800 MG: 400 TABLET, FILM COATED ORAL at 23:07

## 2017-10-21 RX ADMIN — CLINDAMYCIN HYDROCHLORIDE 300 MG: 150 CAPSULE ORAL at 23:07

## 2017-10-22 NOTE — ED PROVIDER NOTES
HPI Comments: Pt presents to ed with an abscess to right lower leg, no fever, history of same in past and history of iv drug use in past she is on methadone       Past Medical History:   Diagnosis Date    Chronic obstructive pulmonary disease ([facility] [address] 2016    severe. PFT confirmed. 2016    COPD     Cord compression [facility])     Dr Rajan Kitchen Current smoker 2016    Depression     Hepatitis C antibody test positive 2015    Negative viral load, Immune    History of cocaine abuse     History of heroin abuse     Hypercholesterolemia     Mass of left breast on mammogram 2017    stable mass at 12:00 position. Recommend yearly mammo    Other diseases of lung, not elsewhere classified     pneumonia    Seizures ([facility] [address] 2007    complex partial, unk etiology; Dr. Brandon. Daniele Carrizales, Neurology-    Spinal stenosis     Thyroid disease     many years per pt    Tobacco use        Past Surgical History:   Procedure Laterality Date    HX CERVICAL DISKECTOMY      HX  SECTION      HX GYN            HX TUBAL LIGATION           Family History:   Problem Relation Age of Onset    Cancer Father 48     lung    Lung Disease Father     Cancer Maternal Grandmother      lung    Lung Disease Maternal Grandmother        Social History     Social History    Marital status: LEGALLY      Spouse name: N/A    Number of children: 2    Years of education: 8     Occupational History    Not on file. Social History Main Topics    Smoking status: Current Every Day Smoker     Packs/day: 0.25     Years: 35.00     Types: Cigarettes    Smokeless tobacco: Never Used    Alcohol use No    Drug use: 7.00 per week     Special: Heroin, Marijuana      Comment:  states it has been awhile since she did Heroin.     Sexual activity: Yes     Partners: Male     Birth control/ protection: Condom     Other Topics Concern     Service No    Blood Transfusions No    Caffeine Concern No    Occupational Exposure No    Hobby Hazards No    Sleep Concern No    Stress Concern No    Weight Concern No    Special Diet No    Back Care No    Exercise No    Bike Helmet No    Seat Belt Yes    Self-Exams No     Social History Narrative         ALLERGIES: Ampicillin; Penicillin g; Adhesive tape-silicones; and Tegretol [carbamazepine]    Review of Systems   Constitutional: Negative for fever. Skin:        abscess       Vitals:    10/21/17 1945   BP: 123/72   Pulse: 97   Resp: 18   Temp: 99.9 °F (37.7 °C)   SpO2: 99%   Weight: 49.9 kg (110 lb)   Height: 5' 1\" (1.549 m)            Physical Exam   Constitutional: She is oriented to person, place, and time. She appears well-developed and well-nourished. HENT:   Head: Normocephalic and atraumatic. Eyes: Conjunctivae and EOM are normal. Pupils are equal, round, and reactive to light. Neck: Normal range of motion. Neck supple. Cardiovascular: Normal rate and regular rhythm. Pulmonary/Chest: Effort normal and breath sounds normal.   Abdominal: Soft. Bowel sounds are normal.   Musculoskeletal: Normal range of motion. Neurological: She is alert and oriented to person, place, and time. She has normal reflexes. Skin: Skin is warm and dry. Swelling and redness around 2 cm abscess, +fluctuant center,  Cellulitis localized around abscess   Psychiatric: She has a normal mood and affect. Her behavior is normal. Judgment and thought content normal.   Nursing note and vitals reviewed. MDM  Number of Diagnoses or Management Options  Cellulitis and abscess of leg, except foot: established and improving  Diagnosis management comments: I and d of abscess, iv clinda was ordered and pt did not want to wait. She asked for po meds instead.   Pt given po clindamycin    Risk of Complications, Morbidity, and/or Mortality  Presenting problems: minimal  Diagnostic procedures: minimal  Management options: minimal      ED Course       I&D Abcess Simple  Date/Time: 10/21/2017 10:27 PM  Performed by: Med Forrester  Authorized by: Med Forrester     Consent:     Consent obtained:  Verbal    Consent given by:  Patient    Risks discussed:  Bleeding, incomplete drainage, pain and infection    Alternatives discussed:  No treatment  Location:     Type:  Abscess    Size:  2 cm    Location:  Lower extremity    Lower extremity location:  Leg    Leg location:  R lower leg  Pre-procedure details:     Skin preparation:  Antiseptic wash and Betadine  Anesthesia (see MAR for exact dosages): Anesthesia method:  Topical application    Topical anesthetic:  LET  Procedure type:     Complexity:  Simple  Procedure details:     Incision types:  Stab incision    Incision depth:  Dermal    Scalpel blade:  11    Wound management:  Probed and deloculated    Drainage:  Bloody and purulent    Drainage amount: Moderate    Wound treatment:  Drain placed    Packing materials:  1/4 in iodoform gauze    Amount 1/4\" iodoform:  2\"  Post-procedure details:     Patient tolerance of procedure: Tolerated well, no immediate complications          Vitals:  Patient Vitals for the past 12 hrs:   Temp Pulse Resp BP SpO2   10/21/17 1945 99.9 °F (37.7 °C) 97 18 123/72 99 %       Medications ordered:   Medications   sodium chloride 0.9 % bolus infusion 1,000 mL (not administered)   ibuprofen (MOTRIN) tablet 800 mg (not administered)   clindamycin (CLEOCIN) capsule 300 mg (not administered)   lidocaine/EPINEPHrine/tetracaine (LET) topical soln (2 mL Topical Given 10/21/17 2127)           Reevaluation of patient:   I have reassessed the patient. Patient is feeling better and is asking to go home      Disposition:    Diagnosis:   1.  Cellulitis and abscess of leg, except foot        Disposition: to Home     Follow-up Information     Follow up With Details Comments Contact Info    Arnel Zamora MD In 2 days For wound re-check [address] [place] [address]  [phone] Patient's Medications   Start Taking    CLINDAMYCIN (CLEOCIN) 300 MG CAPSULE    Take 1 Cap by mouth four (4) times daily for 10 days. IBUPROFEN (MOTRIN) 800 MG TABLET    Take 1 Tab by mouth every six (6) hours as needed for Pain for up to 7 days. Continue Taking    ALBUTEROL (VENTOLIN HFA) 90 MCG/ACTUATION INHALER    Take 2 Puffs by inhalation every four (4) hours as needed (Cough). Indications: Chronic Obstructive Pulmonary Disease    BUPRENORPHINE-NALOXONE 8-2 MG SUBL    DISSOLVE 1 T PO BID    DULOXETINE (CYMBALTA) 60 MG CAPSULE    Take 1 Cap by mouth daily. For nerve pain    LACOSAMIDE (VIMPAT) 200 MG TAB TABLET    Take 1 Tab by mouth two (2) times a day. Indications: PARTIAL EPILEPSY TREATMENT ADJUNCT    NALOXONE (NARCAN) 2 MG/ACTUATION SPRY    Use 1 spray intranasally into 1 nostril. Use a new Narcan nasal spray for subsequent doses and administer into alternating nostrils. May repeat every 2 to 3 minutes as needed. Indications: OPIATE-INDUCED RESPIRATORY DEPRESSION    OMEGA-3 ACID ETHYL ESTERS (LOVAZA) 1 GRAM CAPSULE    Take 2 Caps by mouth daily (with breakfast). Indications: hypertriglyceridemia   These Medications have changed    No medications on file   Stop Taking    No medications on file       Return to the ER if you are unable to obtain referral as directed. Isaura Abraham  results have been reviewed with her. She has been counseled regarding her diagnosis, treatment, and plan. She verbally conveys understanding and agreement of the signs, symptoms, diagnosis, treatment and prognosis and additionally agrees to follow up as discussed. She also agrees with the care-plan and conveys that all of her questions have been answered.   I have also provided discharge instructions for her that include: educational information regarding their diagnosis and treatment, and list of reasons why they would want to return to the ED prior to their follow-up appointment, should her condition change.     Deondre SZYMANSKIP-C

## 2017-10-22 NOTE — ED NOTES
Unable to establish IV access in the patient. Roxboro Tootie, NP made aware and Patricio Jordan RN Charge will attempt to gain access.

## 2017-10-22 NOTE — DISCHARGE INSTRUCTIONS

## 2017-10-29 ENCOUNTER — HOSPITAL ENCOUNTER (EMERGENCY)
Age: 52
Discharge: HOME | End: 2017-10-29
Attending: EMERGENCY MEDICINE
Payer: MEDICAID

## 2017-10-29 VITALS
BODY MASS INDEX: 20.77 KG/M2 | SYSTOLIC BLOOD PRESSURE: 115 MMHG | WEIGHT: 110 LBS | HEIGHT: 61 IN | DIASTOLIC BLOOD PRESSURE: 83 MMHG | OXYGEN SATURATION: 99 % | TEMPERATURE: 98.4 F | RESPIRATION RATE: 16 BRPM | HEART RATE: 89 BPM

## 2017-10-29 DIAGNOSIS — Z51.89 WOUND CHECK, ABSCESS: Primary | ICD-10-CM

## 2017-10-29 PROCEDURE — 99282 EMERGENCY DEPT VISIT SF MDM: CPT

## 2017-10-29 NOTE — ED PROVIDER NOTES
HPI Comments: 9:27 AM: Sheila Goetz is a 46y.o. year old female with hx of COPD and Hypercholesterolemia presenting to the ED with c/o of follow up for rt leg wound onset 8 days ago. Pt states she had an ingrown hair abscess removed 8 days ago and began Abx 7days ago but believes it might be getting worse as she was \"not feeling well\" 3 days ago with the packing of the wound a greenish shade. Denies fever, chills, or diarrhea. The history is provided by the patient. Past Medical History:   Diagnosis Date    Chronic obstructive pulmonary disease ([facility] [address] 2016    severe. PFT confirmed. 2016    COPD     Cord compression [facility])     Dr Estrella Finley Current smoker 2016    Depression     Hepatitis C antibody test positive 2015    Negative viral load, Immune    History of cocaine abuse     History of heroin abuse     Hypercholesterolemia     Mass of left breast on mammogram 2017    stable mass at 12:00 position. Recommend yearly mammo    Other diseases of lung, not elsewhere classified     pneumonia    Seizures ([facility] [address] 2007    complex partial, unk etiology; Dr. Wilfrid Brown, Neurology-    Spinal stenosis     Thyroid disease     many years per pt    Tobacco use        Past Surgical History:   Procedure Laterality Date    HX CERVICAL DISKECTOMY      HX  SECTION      HX GYN            HX TUBAL LIGATION           Family History:   Problem Relation Age of Onset    Cancer Father 48     lung    Lung Disease Father     Cancer Maternal Grandmother      lung    Lung Disease Maternal Grandmother        Social History     Social History    Marital status: LEGALLY      Spouse name: N/A    Number of children: 2    Years of education: 8     Occupational History    Not on file.      Social History Main Topics    Smoking status: Current Every Day Smoker     Packs/day: 0.25     Years: 35.00     Types: Cigarettes    Smokeless tobacco: Never Used    Alcohol use No  Drug use: 7.00 per week     Special: Heroin, Marijuana      Comment:  states it has been awhile since she did Heroin.  Sexual activity: Yes     Partners: Male     Birth control/ protection: Condom     Other Topics Concern     Service No    Blood Transfusions No    Caffeine Concern No    Occupational Exposure No    Hobby Hazards No    Sleep Concern No    Stress Concern No    Weight Concern No    Special Diet No    Back Care No    Exercise No    Bike Helmet No    Seat Belt Yes    Self-Exams No     Social History Narrative         ALLERGIES: Ampicillin; Penicillin g; Adhesive tape-silicones; and Tegretol [carbamazepine]    Review of Systems   Constitutional: Negative for chills and fever. Gastrointestinal: Negative for diarrhea. Skin: Positive for wound (rt leg abcess removed 8 days ago). All other systems reviewed and are negative. Vitals:    10/29/17 0917   BP: 115/83   Pulse: 89   Resp: 16   Temp: 98.4 °F (36.9 °C)   SpO2: 92%   Weight: 49.9 kg (110 lb)   Height: 5' 1\" (1.549 m)            Physical Exam   Constitutional: She is oriented to person, place, and time. She appears well-developed. HENT:   Head: Normocephalic and atraumatic. Eyes: EOM are normal. Pupils are equal, round, and reactive to light. Neck: Normal range of motion. Neck supple. Cardiovascular: Normal rate, regular rhythm and normal heart sounds. Exam reveals no friction rub. No murmur heard. Pulmonary/Chest: Effort normal and breath sounds normal. No respiratory distress. She has no wheezes. Abdominal: Soft. She exhibits no distension. There is no tenderness. There is no rebound and no guarding. Musculoskeletal: Normal range of motion. Neurological: She is alert and oriented to person, place, and time. Skin: Skin is warm and dry. 1cm abscess healing to RLL  No cellulitits   Psychiatric: She has a normal mood and affect.  Her behavior is normal. Thought content normal.        [facility]  Number of Diagnoses or Management Options  Wound check, abscess:   Diagnosis management comments: Abscess; healing ; no repack. Cont outpt management    ED Course       Procedures      Scribe Attestation     Vonda Duran acting as a scribe for and in the presence of Santosh Hutchins MD      October 29, 2017 at 9:33 AM       Provider Attestation:      I personally performed the services described in the documentation, reviewed the documentation, as recorded by the scribe in my presence, and it accurately and completely records my words and actions.  October 29, 2017 at 9:33 AM - Santosh Hutchins MD

## 2020-07-29 NOTE — ED NOTES
I performed a brief evaluation, including history and physical, of the patient here in triage and I have determined that pt will need further treatment and evaluation from the main side ER physician. I have placed initial orders to help in expediting patients care. October 21, 2017 at 8:07 PM - JESS Lpóez        Visit Vitals    /72 (BP 1 Location: Left arm)    Pulse 97    Temp 99.9 °F (37.7 °C)    Resp 18    Ht 5' 1\" (1.549 m)    Wt 49.9 kg (110 lb)    SpO2 99%    BMI 20.78 kg/m2        Pt c/o right lower leg swelling and abscess. Pt is IV drug user. rc'd message from pcp about pt 5th toe dislocation. Per Dr. Dane Negro add her on today. Called pt and had bad connection and couldn't hear her. Called her back LMTCB. If she CB please offer her appt today @ [address] with Dr. Dane Negro at [facility].

## 2021-12-29 NOTE — BH NOTES
\"I just want to feel better\". Pt. has been polite and cooperative in the milieu socializing with staff and peers. Pt. denies suicidal/homicidal ideations, audio/visual hallucination. Pt contracts for safety on the unit agree to come to staff if feeling harm to self or others. Pt.denies any new medical/pain complaints. Pt. completed ADL. Pt. ate 100% of meals and took scheduled medications. Pt. participate in group. Pt. did not have any visitors. Staff encouraged Pt. to  participate in treatment,  medication and group therapy. Pt agreed. Pt. remain free of falls and provided non skid socks. Staff will continue to monitor Pt. for behavior safety and location. Anesthesia Volume In Cc: 9

## 2025-04-30 NOTE — DISCHARGE INSTRUCTIONS
Opioid Overdose: Care Instructions  Your Care Instructions    You have had treatment to help your body recover from taking too much of an opioid. You are getting better, but you may not feel well for a while. It takes time for the opioids to leave your body. How long it takes to feel better depends on which drug you took and how much you took of it. Opioids include illegal drugs such as heroin, often called smack, junk, H, and ska. Opioids also include medicines that doctors prescribe to treat pain. These are medicines such as oxycodone, methadone, and buprenorphine. They are sometimes sold and used illegally. Taking too much of an opioid can be dangerous. It may cause:  · Trouble breathing. · Low blood pressure. · A low heart rate. · A coma. When the doctor treated you for the overdose, he or she may have:  · Watched your symptoms or done tests to find out what kind of drug you took. · Given you fluids. · Given you oxygen to help you breathe. · Given you a medicine called naloxone to help reverse the effects of the opioid. · Done several tests, including blood tests, to see how you're responding to treatment. The doctor also watched you carefully to make sure you were recovering safely. Follow-up care is a key part of your treatment and safety. Be sure to make and go to all appointments, and call your doctor if you are having problems. It's also a good idea to know your test results and keep a list of the medicines you take. How can you care for yourself at home? · If you take opioids regularly, your body gets used to them. This is called dependency. If you are dependent on this drug, you may have withdrawal symptoms when you stop taking it. These can include nausea, sweating, chills, diarrhea, stomach cramps, and muscle aches. Withdrawal can last up to several weeks, depending on which drug you took. You may feel very ill, but you are probably not in medical danger.   · Your doctor may give you medicine to help you feel better. To help get through withdrawal, you can also:  ¨ Get plenty of rest.  ¨ Drink plenty of fluids. ¨ Stay active, but don't tire yourself. ¨ Eat a healthy diet. · If you had a tube in your throat to help you breathe, you may have a sore throat or hoarseness that can last a few days. Sip liquids to help soothe your throat. · Do not drink alcohol or take illegal drugs. · Do not drive if you feel sleepy or groggy while you recover from your overdose. · Get help to stop using drugs. Talk to your doctor about drug treatment programs. · Talk to your doctor or pharmacist about having a naloxone rescue kit on hand. When should you call for help? Call 911 anytime you think you may need emergency care. For example, call if:  · You feel you cannot stop from hurting yourself or someone else. Call your doctor now or seek immediate medical care if:  · You have new or worse withdrawal symptoms, such as:  ¨ Stomach cramps. ¨ Vomiting. ¨ Diarrhea. ¨ Muscle aches. ¨ Sweating. Watch closely for changes in your health, and be sure to contact your doctor if:  · You do not get better as expected. Where can you learn more? Go to http://emigdio-mikey.info/. Enter [id number] [id number] in the search box to learn more about \"Opioid Overdose: Care Instructions. \"  Current as of: February 21, 2017  Content Version: 11.3  © 2174-4898 [employer]. Care instructions adapted under license by [employer] (which disclaims liability or warranty for this information). If you have questions about a medical condition or this instruction, always ask your healthcare professional. [place] [address] any warranty or liability for your use of this information. Alcohol, Drug, or Poison Ingestion: Care Instructions  Your Care Instructions  A person can become very sick, or die, from swallowing or using alcohol, drugs, or poisons.   Alcohol poisoning occurs when a person drinks a large amount of alcohol. Alcohol can stop nerve signals that control breathing. It can also stop the gag reflex that prevents choking. Alcohol poisoning is serious. It can lead to brain damage or death if it's not treated right away. Drugs can be used by accident or on purpose. They can be swallowed, inhaled, injected, or absorbed through the skin. Drugs include over-the-counter medicine (such as aspirin or acetaminophen) and prescription medicine. They also include vitamins and supplements. And they include illegal drugs such as cocaine and heroin. And poisons are all around us. They include household , cosmetics, houseplants, and garden chemicals. The doctor has checked you carefully, but problems can develop later. If you notice any problems or new symptoms, get medical treatment right away. Follow-up care is a key part of your treatment and safety. Be sure to make and go to all appointments, and call your doctor if you are having problems. It's also a good idea to know your test results and keep a list of the medicines you take. How can you care for yourself at home? Alcohol problems  · Talk to your doctor or counselor about programs that can help you stop using alcohol. · Plan ways to avoid being tempted to drink. ¨ Get rid of all alcohol in your home. ¨ Avoid places where you tend to drink. ¨ Stay away from places or events that offer alcohol. ¨ Stay away from people who drink a lot. Drug problems  · Talk to your doctor about programs that can help you stop using drugs. · Get rid of any drugs you might be tempted to misuse. · Learn how to say no when other people use drugs. · Don't spend time with people who use drugs. Poison prevention  · Keep products in the containers they came in. Keep them with the original labels. · Be careful when you use cleaning products, paints, solvents, and pesticides. Read labels before use.  Use a fan to move strong odors and fumes out of your home. · Do not mix cleaning products. Try to use nontoxic . These include vinegar, lemon juice, and baking soda. When should you call for help? Poison control centers, hospitals, or your doctor can give immediate advice in the case of a poisoning. The [facility] number is [phone]. Have the poison container with you so you can give complete information to the poison control center, such as what the poison or substance is, how much was taken and when. Do not try to make the person vomit. Call 911 anytime you think you may need emergency care. For example, call if you or someone else:  · Has used or currently uses alcohol or drugs and is very confused or can't stay awake. · Has passed out (lost consciousness). · Has severe trouble breathing. · Is having a seizure. Call your doctor now or seek immediate medical care if you or someone else:  · Has new symptoms, or is not acting normally. Watch closely for changes in your health, and be sure to contact your doctor if:  · You do not get better as expected. · You need help with drug or alcohol problems. · You have problems with depression or other mental health issues. Where can you learn more? Go to http://emigdio-mikey.info/. Enter E960 in the search box to learn more about \"Alcohol, Drug, or Poison Ingestion: Care Instructions. \"  Current as of: March 20, 2017  Content Version: 11.3  © 0694-7710 [employer]. Care instructions adapted under license by [employer] (which disclaims liability or warranty for this information). If you have questions about a medical condition or this instruction, always ask your healthcare professional. [place] [address] any warranty or liability for your use of this information. 3-4 cups/cans per day